# Patient Record
Sex: FEMALE | Race: WHITE | NOT HISPANIC OR LATINO | Employment: UNEMPLOYED | URBAN - METROPOLITAN AREA
[De-identification: names, ages, dates, MRNs, and addresses within clinical notes are randomized per-mention and may not be internally consistent; named-entity substitution may affect disease eponyms.]

---

## 2019-02-21 ENCOUNTER — HOSPITAL ENCOUNTER (OUTPATIENT)
Dept: CARDIOLOGY | Facility: HOSPITAL | Age: 34
Discharge: HOME | End: 2019-02-21
Attending: SURGERY

## 2019-03-24 ENCOUNTER — HOSPITAL ENCOUNTER (OUTPATIENT)
Dept: URGENT CARE | Facility: CLINIC | Age: 34
Discharge: HOME | End: 2019-03-24

## 2020-01-30 ENCOUNTER — HOSPITAL ENCOUNTER (OUTPATIENT)
Dept: OTHER | Facility: HOSPITAL | Age: 35
Discharge: HOME | End: 2020-01-30

## 2021-01-22 ENCOUNTER — HOSPITAL ENCOUNTER (OUTPATIENT)
Dept: LAB | Facility: HOSPITAL | Age: 36
Discharge: HOME | End: 2021-01-22
Attending: PHYSICIAN ASSISTANT

## 2021-01-22 ENCOUNTER — OFFICE VISIT CONVERTED (OUTPATIENT)
Dept: FAMILY MEDICINE CLINIC | Facility: CLINIC | Age: 36
End: 2021-01-22
Attending: PHYSICIAN ASSISTANT

## 2021-01-22 ENCOUNTER — HOSPITAL ENCOUNTER (OUTPATIENT)
Dept: GENERAL RADIOLOGY | Facility: HOSPITAL | Age: 36
Discharge: HOME | End: 2021-01-22
Attending: PHYSICIAN ASSISTANT

## 2021-01-22 ENCOUNTER — CONVERSION ENCOUNTER (OUTPATIENT)
Dept: FAMILY MEDICINE CLINIC | Facility: CLINIC | Age: 36
End: 2021-01-22

## 2021-01-22 LAB
ALBUMIN SERPL-MCNC: 4.2 G/DL (ref 3.5–5)
ALBUMIN/GLOB SERPL: 1.4 {RATIO} (ref 1.4–2.6)
ALP SERPL-CCNC: 91 U/L (ref 42–98)
ALT SERPL-CCNC: 14 U/L (ref 10–40)
ANION GAP SERPL CALC-SCNC: 16 MMOL/L (ref 8–19)
AST SERPL-CCNC: 18 U/L (ref 15–50)
BASOPHILS # BLD AUTO: 0.07 10*3/UL (ref 0–0.2)
BASOPHILS NFR BLD AUTO: 0.7 % (ref 0–3)
BILIRUB SERPL-MCNC: 0.26 MG/DL (ref 0.2–1.3)
BUN SERPL-MCNC: 13 MG/DL (ref 5–25)
BUN/CREAT SERPL: 18 {RATIO} (ref 6–20)
CALCIUM SERPL-MCNC: 9 MG/DL (ref 8.7–10.4)
CHLORIDE SERPL-SCNC: 105 MMOL/L (ref 99–111)
CHOLEST SERPL-MCNC: 205 MG/DL (ref 107–200)
CHOLEST/HDLC SERPL: 3.8 {RATIO} (ref 3–6)
CONV ABS IMM GRAN: 0.04 10*3/UL (ref 0–0.2)
CONV CO2: 21 MMOL/L (ref 22–32)
CONV IMMATURE GRAN: 0.4 % (ref 0–1.8)
CONV TOTAL PROTEIN: 7.1 G/DL (ref 6.3–8.2)
CREAT UR-MCNC: 0.71 MG/DL (ref 0.5–0.9)
DEPRECATED RDW RBC AUTO: 42.7 FL (ref 36.4–46.3)
EOSINOPHIL # BLD AUTO: 0.15 10*3/UL (ref 0–0.7)
EOSINOPHIL # BLD AUTO: 1.5 % (ref 0–7)
ERYTHROCYTE [DISTWIDTH] IN BLOOD BY AUTOMATED COUNT: 13.4 % (ref 11.7–14.4)
GFR SERPLBLD BASED ON 1.73 SQ M-ARVRAT: >60 ML/MIN/{1.73_M2}
GLOBULIN UR ELPH-MCNC: 2.9 G/DL (ref 2–3.5)
GLUCOSE SERPL-MCNC: 91 MG/DL (ref 65–99)
HCT VFR BLD AUTO: 39.9 % (ref 37–47)
HDLC SERPL-MCNC: 54 MG/DL (ref 40–60)
HGB BLD-MCNC: 13.1 G/DL (ref 12–16)
LDLC SERPL CALC-MCNC: 124 MG/DL (ref 70–100)
LYMPHOCYTES # BLD AUTO: 2.87 10*3/UL (ref 1–5)
LYMPHOCYTES NFR BLD AUTO: 27.8 % (ref 20–45)
MCH RBC QN AUTO: 28.5 PG (ref 27–31)
MCHC RBC AUTO-ENTMCNC: 32.8 G/DL (ref 33–37)
MCV RBC AUTO: 86.9 FL (ref 81–99)
MONOCYTES # BLD AUTO: 0.61 10*3/UL (ref 0.2–1.2)
MONOCYTES NFR BLD AUTO: 5.9 % (ref 3–10)
NEUTROPHILS # BLD AUTO: 6.57 10*3/UL (ref 2–8)
NEUTROPHILS NFR BLD AUTO: 63.7 % (ref 30–85)
NRBC CBCN: 0 % (ref 0–0.7)
OSMOLALITY SERPL CALC.SUM OF ELEC: 286 MOSM/KG (ref 273–304)
PLATELET # BLD AUTO: 268 10*3/UL (ref 130–400)
PMV BLD AUTO: 10.2 FL (ref 9.4–12.3)
POTASSIUM SERPL-SCNC: 4.3 MMOL/L (ref 3.5–5.3)
RBC # BLD AUTO: 4.59 10*6/UL (ref 4.2–5.4)
SODIUM SERPL-SCNC: 138 MMOL/L (ref 135–147)
TRIGL SERPL-MCNC: 134 MG/DL (ref 40–150)
TSH SERPL-ACNC: 3.32 M[IU]/L (ref 0.27–4.2)
VLDLC SERPL-MCNC: 27 MG/DL (ref 5–37)
WBC # BLD AUTO: 10.31 10*3/UL (ref 4.8–10.8)

## 2021-05-05 ENCOUNTER — HOSPITAL ENCOUNTER (OUTPATIENT)
Dept: OTHER | Facility: HOSPITAL | Age: 36
Discharge: HOME | End: 2021-05-05
Attending: NURSE PRACTITIONER

## 2021-05-10 NOTE — H&P
History and Physical      Patient Name: Tona Porter   Patient ID: [id number]   Sex: Female   YOB: 1985    Primary Care Provider: Brittany SEARS   Referring Provider: Brittany SEARS    Visit Date: January 22, 2021    Provider: ORION Vallejo   [place] [facility] [place] Address: [address] [place] [address], [address]  [place], [place]  [id number]   Location Phone: [phone]          Chief Complaint  · New Patient-Establish Care   · Needing referral to Pulmonology & Neurology       History Of Present Illness  Tona Proter is a 35 year old /White female who presents for evaluation and treatment of:      New Patient to establish care, previous patient at [facility] in [facility] but states they moved; last seen about 5 years ago.     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 States last CXR was 2011.    She also has RSD in her right arm and is needing referral to Neurology; previously saw neuro with [facility] unsure of name. She states she was last seen in [id number]. She states she was seeing Pain Management ([facility] but was discharged then referred to [place] but never went) but did not like seeing them.    Labs: over 5 years  Pap: overdue       Past Medical History  Disease Name Date Onset Notes   Arthritis --  --    Seasonal allergies --  --    Shoulder Impingement, left 08/04/2017 --          Past Surgical History  Procedure Name Date Notes   Tubal ligation --  --          Medication List  Name Date Started Instructions   cyclobenzaprine 10 mg oral tablet  1 tablet once a day   Seroquel 100 mg oral tablet  takt 1/2 tablet once at night         Allergy List  Allergen Name Date Reaction Notes   PENICILLINS --  --  --          Family Medical History  Disease Name Relative/Age Notes   *No Known Family History  --    Family history of certain chronic  "disabling diseases; arthritis Brother/  Father/  Mother/  Sister/   Mother; Father; Sister; Brother         Social History  Finding Status Start/Stop Quantity Notes   Alcohol Use Current some day --/-- --  rarely drinks, less than 1 drink per day, has been drinking for 6-10 years  08/04/201 occassionally   lives alone --  --/-- --  --    Recreational Drug Use Never --/-- --  no   Single. --  --/-- --  --    Tobacco Current every day --/-- 0.5 PPD current every day smoker, 0.5 pack per day, smoked 11-20 years  current every day smoker, 1 packs per day, smoked 6-10 years   Unemployed. --  --/-- --  --          Review of Systems  · Constitutional  o Denies  o : fatigue, night sweats  · Eyes  o Denies  o : double vision, blurred vision  · HENT  o Denies  o : vertigo, recent head injury  · Breasts  o Denies  o : abnormal changes in breast size, additional breast symptoms except as noted in the HPI  · Cardiovascular  o Denies  o : chest pain, irregular heart beats  · Respiratory  o Admits  o : shortness of breath, productive cough  · Gastrointestinal  o Admits  o : nausea  o Denies  o : vomiting  · Genitourinary  o Admits  o : frequency  o Denies  o : dysuria, urinary retention  · Integument  o Denies  o : hair growth change, new skin lesions  · Neurologic  o Admits  o : right arm rsd pain  o Denies  o : altered mental status, seizures  · Musculoskeletal  o Denies  o : joint swelling, limitation of motion  · Endocrine  o Denies  o : cold intolerance, heat intolerance  · Heme-Lymph  o Denies  o : petechiae, lymph node enlargement or tenderness  · Allergic-Immunologic  o Denies  o : frequent illnesses      Vitals  Date Time BP Position Site L\R Cuff Size HR RR TEMP (F) WT  HT  BMI kg/m2 BSA m2 O2 Sat FR L/min FiO2 HC       01/22/2021 09:15 /64 Sitting    76 - R  97.5 143lbs 4oz 5'  2\" 26.2 1.69 99 %  21%          Physical Examination  · Constitutional  o Appearance  o : well developed, well-nourished, no acute " distress  · Head and Face  o Head  o : normocephalic, atraumatic  · Neck  o Inspection/Palpation  o : normal appearance, no masses or tenderness, trachea midline  o Thyroid  o : gland size normal, nontender, no nodules or masses present on palpation  · Respiratory  o Respiratory Effort  o : breathing unlabored  o Inspection of Chest  o : chest rise symmetric bilaterally  o Auscultation of Lungs  o : clear to auscultation bilaterally throughout inspiration and expiration  · Cardiovascular  o Heart  o :   § Auscultation of Heart  § : regular rate and rhythm, no murmurs, gallops or rubs  o Peripheral Vascular System  o :   § Extremities  § : no edema  · Lymphatic  o Neck  o : no cervical lymphadenopathy, no supraclavicular lymphadenopathy  · Psychiatric  o Mood and Affect  o : mood normal, affect appropriate              Assessment  · Screening for depression     V79.0/Z13.89  · Screening for lipid disorders     V77.91/Z13.220  · Nicotine dependence     305.1/F17.200  · Lung mass     786.6/R91.8  · RSD (reflex sympathetic dystrophy)     337.20/G90.50  · Establishing care with new doctor, encounter for     V65.8/Z76.89       CXR. Refer to neurology and will check basic labs. Continued care will depend upon CXR results and labs.     Problems Reconciled  Plan  · Orders  o ACO-17: Screened for tobacco use AND received tobacco cessation intervention (4004F) - 305.1/F17.200 - 01/22/2021  o Physical, Primary Care Panel (CBC, CMP, Lipid, TSH) [facility] (89689, 82265, 66506, 79163) - 786.6/R91.8, 337.20/G90.50, V77.91/Z13.220 - 01/22/2021  o ACO-18: Negative screen for clinical depression using a standardized tool () - - 01/22/2021  o ACO-14: Influenza immunization was not administered for reasons documented [facility] () - - 01/22/2021  o ACO-39: Current medications updated and reviewed (, 9719F) - - 01/22/2021  o CXR PA/Lat [facility] (14106) - 786.6/R91.8 - 01/22/2021  o NEUROLOGY CONSULTATION. (NEURO) -  337.20/G90.50 - 01/22/2021  · Medications  o Medications have been Reconciled  o Transition of Care or Provider Policy  · Instructions  o Depression Screen completed and scanned into the EMR under the designated folder within the patient's documents.  o Today's PHQ-9 result is _0__  o *Form of nicotine being used: cigarettes  o Patient was strongly encouraged to discontinue use of any nicotine containing product or minimize the use of the product.  o Patient was educated/instructed on their diagnosis, treatment and medications prior to discharge from the clinic today.  o Call the office with any concerns or questions.  o Chronic conditions reviewed and taken into consideration for today's treatment plan.  o Discussed Covid-19 precautions including, but not limited to, social distancing, avoid touching your face, and hand washing.   o Electronically Identified Patient Education Materials Provided Electronically  · Disposition  o Call or Return if symptoms worsen or persist.  o Follow Up PRN.            Electronically Signed by: ORION Vallejo -Author on January 22, 2021 10:33:57 AM

## 2021-05-14 VITALS
SYSTOLIC BLOOD PRESSURE: 100 MMHG | TEMPERATURE: 97.5 F | OXYGEN SATURATION: 99 % | HEIGHT: 62 IN | HEART RATE: 76 BPM | WEIGHT: 143.25 LBS | BODY MASS INDEX: 26.36 KG/M2 | DIASTOLIC BLOOD PRESSURE: 64 MMHG

## 2021-05-21 ENCOUNTER — HOSPITAL ENCOUNTER (OUTPATIENT)
Dept: OTHER | Facility: HOSPITAL | Age: 36
Discharge: HOME | End: 2021-05-21
Attending: NURSE PRACTITIONER

## 2021-06-09 ENCOUNTER — OFFICE VISIT (OUTPATIENT)
Dept: NEUROLOGY | Facility: CLINIC | Age: 36
End: 2021-06-09

## 2021-06-09 VITALS
SYSTOLIC BLOOD PRESSURE: 128 MMHG | WEIGHT: 142 LBS | DIASTOLIC BLOOD PRESSURE: 83 MMHG | HEIGHT: 62 IN | BODY MASS INDEX: 26.13 KG/M2

## 2021-06-09 DIAGNOSIS — G90.511 COMPLEX REGIONAL PAIN SYNDROME TYPE 1 OF RIGHT UPPER EXTREMITY: Primary | ICD-10-CM

## 2021-06-09 PROCEDURE — 99215 OFFICE O/P EST HI 40 MIN: CPT | Performed by: NURSE PRACTITIONER

## 2021-06-09 RX ORDER — ESCITALOPRAM OXALATE 10 MG/1
10 TABLET ORAL DAILY
COMMUNITY
Start: 2021-04-27

## 2021-06-09 RX ORDER — ERGOCALCIFEROL 1.25 MG/1
CAPSULE ORAL
COMMUNITY
Start: 2021-06-01

## 2021-06-09 NOTE — ASSESSMENT & PLAN NOTE
PCP could consider switching lexapro to cymbalta or amitripyline for anti-neuritic properties.  Will refer to pain management for further pain control.
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
## 2021-07-10 ENCOUNTER — APPOINTMENT (OUTPATIENT)
Dept: GENERAL RADIOLOGY | Facility: HOSPITAL | Age: 36
End: 2021-07-10

## 2021-07-10 ENCOUNTER — APPOINTMENT (OUTPATIENT)
Dept: CT IMAGING | Facility: HOSPITAL | Age: 36
End: 2021-07-10

## 2021-07-10 ENCOUNTER — HOSPITAL ENCOUNTER (EMERGENCY)
Facility: HOSPITAL | Age: 36
Discharge: HOME | End: 2021-07-10
Attending: EMERGENCY MEDICINE | Admitting: EMERGENCY MEDICINE

## 2021-07-10 VITALS
OXYGEN SATURATION: 97 % | HEIGHT: 62 IN | HEART RATE: 89 BPM | RESPIRATION RATE: 16 BRPM | BODY MASS INDEX: 27.6 KG/M2 | WEIGHT: 150 LBS | TEMPERATURE: 98.1 F | SYSTOLIC BLOOD PRESSURE: 148 MMHG | DIASTOLIC BLOOD PRESSURE: 89 MMHG

## 2021-07-10 DIAGNOSIS — S52.022A FRACTURE, OLECRANON, LEFT, CLOSED, INITIAL ENCOUNTER: Primary | ICD-10-CM

## 2021-07-10 PROCEDURE — 96375 TX/PRO/DX INJ NEW DRUG ADDON: CPT

## 2021-07-10 PROCEDURE — 73200 CT UPPER EXTREMITY W/O DYE: CPT

## 2021-07-10 PROCEDURE — 73110 X-RAY EXAM OF WRIST: CPT | Performed by: RADIOLOGY

## 2021-07-10 PROCEDURE — 73110 X-RAY EXAM OF WRIST: CPT

## 2021-07-10 PROCEDURE — 73120 X-RAY EXAM OF HAND: CPT | Performed by: RADIOLOGY

## 2021-07-10 PROCEDURE — 25010000002 ONDANSETRON PER 1 MG: Performed by: EMERGENCY MEDICINE

## 2021-07-10 PROCEDURE — 99284 EMERGENCY DEPT VISIT MOD MDM: CPT

## 2021-07-10 PROCEDURE — 25010000002 TDAP 5-2.5-18.5 LF-MCG/0.5 SUSPENSION: Performed by: EMERGENCY MEDICINE

## 2021-07-10 PROCEDURE — 73080 X-RAY EXAM OF ELBOW: CPT

## 2021-07-10 PROCEDURE — 90471 IMMUNIZATION ADMIN: CPT | Performed by: EMERGENCY MEDICINE

## 2021-07-10 PROCEDURE — 90715 TDAP VACCINE 7 YRS/> IM: CPT | Performed by: EMERGENCY MEDICINE

## 2021-07-10 PROCEDURE — 25010000002 HYDROMORPHONE 1 MG/ML SOLUTION: Performed by: EMERGENCY MEDICINE

## 2021-07-10 PROCEDURE — 73080 X-RAY EXAM OF ELBOW: CPT | Performed by: RADIOLOGY

## 2021-07-10 PROCEDURE — 96374 THER/PROPH/DIAG INJ IV PUSH: CPT

## 2021-07-10 PROCEDURE — 73120 X-RAY EXAM OF HAND: CPT

## 2021-07-10 PROCEDURE — 96372 THER/PROPH/DIAG INJ SC/IM: CPT

## 2021-07-10 PROCEDURE — 96376 TX/PRO/DX INJ SAME DRUG ADON: CPT

## 2021-07-10 RX ORDER — OXYCODONE HYDROCHLORIDE AND ACETAMINOPHEN 5; 325 MG/1; MG/1
1 TABLET ORAL EVERY 6 HOURS PRN
Qty: 15 TABLET | Refills: 0 | Status: SHIPPED | OUTPATIENT
Start: 2021-07-10 | End: 2021-07-15 | Stop reason: HOSPADM

## 2021-07-10 RX ORDER — OXYCODONE HYDROCHLORIDE AND ACETAMINOPHEN 5; 325 MG/1; MG/1
1 TABLET ORAL ONCE
Status: COMPLETED | OUTPATIENT
Start: 2021-07-10 | End: 2021-07-10

## 2021-07-10 RX ORDER — ONDANSETRON 2 MG/ML
4 INJECTION INTRAMUSCULAR; INTRAVENOUS ONCE
Status: COMPLETED | OUTPATIENT
Start: 2021-07-10 | End: 2021-07-10

## 2021-07-10 RX ORDER — OXYCODONE HYDROCHLORIDE AND ACETAMINOPHEN 5; 325 MG/1; MG/1
1 TABLET ORAL EVERY 6 HOURS PRN
Qty: 12 TABLET | Refills: 0 | Status: SHIPPED | OUTPATIENT
Start: 2021-07-10 | End: 2021-07-10 | Stop reason: SDUPTHER

## 2021-07-10 RX ORDER — ONDANSETRON 4 MG/1
4 TABLET, ORALLY DISINTEGRATING ORAL EVERY 6 HOURS PRN
Qty: 10 TABLET | Refills: 0 | Status: SHIPPED | OUTPATIENT
Start: 2021-07-10 | End: 2023-03-22

## 2021-07-10 RX ADMIN — ONDANSETRON 4 MG: 2 INJECTION INTRAMUSCULAR; INTRAVENOUS at 18:07

## 2021-07-10 RX ADMIN — OXYCODONE HYDROCHLORIDE AND ACETAMINOPHEN 1 TABLET: 5; 325 TABLET ORAL at 22:24

## 2021-07-10 RX ADMIN — HYDROMORPHONE HYDROCHLORIDE 1 MG: 1 INJECTION, SOLUTION INTRAMUSCULAR; INTRAVENOUS; SUBCUTANEOUS at 18:07

## 2021-07-10 RX ADMIN — TETANUS TOXOID, REDUCED DIPHTHERIA TOXOID AND ACELLULAR PERTUSSIS VACCINE, ADSORBED 0.5 ML: 5; 2.5; 8; 8; 2.5 SUSPENSION INTRAMUSCULAR at 18:08

## 2021-07-10 RX ADMIN — HYDROMORPHONE HYDROCHLORIDE 1 MG: 1 INJECTION, SOLUTION INTRAMUSCULAR; INTRAVENOUS; SUBCUTANEOUS at 20:00
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
## 2021-07-12 ENCOUNTER — TELEPHONE (OUTPATIENT)
Dept: ORTHOPEDIC SURGERY | Facility: CLINIC | Age: 36
End: 2021-07-12
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
## 2021-07-14 ENCOUNTER — OFFICE VISIT (OUTPATIENT)
Dept: ORTHOPEDIC SURGERY | Facility: CLINIC | Age: 36
End: 2021-07-14

## 2021-07-14 ENCOUNTER — PREP FOR SURGERY (OUTPATIENT)
Dept: OTHER | Facility: HOSPITAL | Age: 36
End: 2021-07-14

## 2021-07-14 VITALS — HEIGHT: 62 IN | WEIGHT: 115 LBS | OXYGEN SATURATION: 98 % | BODY MASS INDEX: 21.16 KG/M2 | HEART RATE: 89 BPM

## 2021-07-14 DIAGNOSIS — S42.402A CLOSED FRACTURE OF LEFT ELBOW, INITIAL ENCOUNTER: Primary | ICD-10-CM

## 2021-07-14 DIAGNOSIS — S63.501A SPRAIN OF RIGHT WRIST, INITIAL ENCOUNTER: ICD-10-CM

## 2021-07-14 PROBLEM — S52.022A OLECRANON FRACTURE, LEFT, CLOSED, INITIAL ENCOUNTER: Status: ACTIVE | Noted: 2021-07-14

## 2021-07-14 PROCEDURE — 99203 OFFICE O/P NEW LOW 30 MIN: CPT | Performed by: ORTHOPAEDIC SURGERY

## 2021-07-14 RX ORDER — CEFAZOLIN SODIUM 2 G/100ML
2 INJECTION, SOLUTION INTRAVENOUS ONCE
Status: CANCELLED | OUTPATIENT
Start: 2021-07-14 | End: 2021-07-14

## 2021-07-14 RX ORDER — CEFAZOLIN SODIUM IN 0.9 % NACL 3 G/100 ML
3 INTRAVENOUS SOLUTION, PIGGYBACK (ML) INTRAVENOUS ONCE
Status: CANCELLED | OUTPATIENT
Start: 2021-07-14 | End: 2021-07-14
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
## 2021-07-15 ENCOUNTER — APPOINTMENT (OUTPATIENT)
Dept: GENERAL RADIOLOGY | Facility: HOSPITAL | Age: 36
End: 2021-07-15

## 2021-07-15 ENCOUNTER — HOSPITAL ENCOUNTER (OUTPATIENT)
Facility: HOSPITAL | Age: 36
Discharge: HOME | End: 2021-07-15
Attending: ORTHOPAEDIC SURGERY | Admitting: ORTHOPAEDIC SURGERY

## 2021-07-15 ENCOUNTER — ANESTHESIA (OUTPATIENT)
Dept: PERIOP | Facility: HOSPITAL | Age: 36
End: 2021-07-15

## 2021-07-15 ENCOUNTER — ANESTHESIA EVENT (OUTPATIENT)
Dept: PERIOP | Facility: HOSPITAL | Age: 36
End: 2021-07-15

## 2021-07-15 VITALS
HEIGHT: 62 IN | RESPIRATION RATE: 16 BRPM | BODY MASS INDEX: 28.16 KG/M2 | OXYGEN SATURATION: 96 % | TEMPERATURE: 97.4 F | SYSTOLIC BLOOD PRESSURE: 132 MMHG | WEIGHT: 153 LBS | DIASTOLIC BLOOD PRESSURE: 75 MMHG | HEART RATE: 78 BPM

## 2021-07-15 DIAGNOSIS — S52.022A OLECRANON FRACTURE, LEFT, CLOSED, INITIAL ENCOUNTER: Primary | ICD-10-CM

## 2021-07-15 DIAGNOSIS — S42.402A CLOSED FRACTURE OF LEFT ELBOW, INITIAL ENCOUNTER: ICD-10-CM

## 2021-07-15 PROCEDURE — 76000 FLUOROSCOPY <1 HR PHYS/QHP: CPT

## 2021-07-15 PROCEDURE — 25010000002 ROPIVACAINE PER 1 MG: Performed by: ANESTHESIOLOGY

## 2021-07-15 PROCEDURE — 25010000002 MIDAZOLAM PER 1 MG: Performed by: ANESTHESIOLOGY

## 2021-07-15 PROCEDURE — 25010000002 ONDANSETRON PER 1 MG: Performed by: NURSE ANESTHETIST, CERTIFIED REGISTERED

## 2021-07-15 PROCEDURE — 24685 OPTX ULNAR FX PROX END W/FIX: CPT | Performed by: ORTHOPAEDIC SURGERY

## 2021-07-15 PROCEDURE — 25010000002 ONDANSETRON PER 1 MG: Performed by: ORTHOPAEDIC SURGERY

## 2021-07-15 PROCEDURE — C1713 ANCHOR/SCREW BN/BN,TIS/BN: HCPCS | Performed by: ORTHOPAEDIC SURGERY

## 2021-07-15 PROCEDURE — 73070 X-RAY EXAM OF ELBOW: CPT

## 2021-07-15 PROCEDURE — 25010000002 DEXAMETHASONE PER 1 MG: Performed by: NURSE ANESTHETIST, CERTIFIED REGISTERED

## 2021-07-15 PROCEDURE — 25010000002 FENTANYL CITRATE (PF) 50 MCG/ML SOLUTION: Performed by: NURSE ANESTHETIST, CERTIFIED REGISTERED

## 2021-07-15 PROCEDURE — 73070 X-RAY EXAM OF ELBOW: CPT | Performed by: RADIOLOGY

## 2021-07-15 PROCEDURE — C1889 IMPLANT/INSERT DEVICE, NOC: HCPCS | Performed by: ORTHOPAEDIC SURGERY

## 2021-07-15 PROCEDURE — 25010000002 PROPOFOL 10 MG/ML EMULSION: Performed by: NURSE ANESTHETIST, CERTIFIED REGISTERED

## 2021-07-15 PROCEDURE — 25010000003 CEFAZOLIN IN DEXTROSE 2-4 GM/100ML-% SOLUTION: Performed by: ORTHOPAEDIC SURGERY

## 2021-07-15 PROCEDURE — C1769 GUIDE WIRE: HCPCS | Performed by: ORTHOPAEDIC SURGERY

## 2021-07-15 PROCEDURE — 76942 ECHO GUIDE FOR BIOPSY: CPT | Performed by: ORTHOPAEDIC SURGERY

## 2021-07-15 DEVICE — SCRW LK S/TAP T8STRDRV 2.7X16MM: Type: IMPLANTABLE DEVICE | Site: ELBOW | Status: FUNCTIONAL

## 2021-07-15 DEVICE — SCRW LK S/TAP T8STRDRV 2.7X14MM: Type: IMPLANTABLE DEVICE | Site: ELBOW | Status: FUNCTIONAL

## 2021-07-15 DEVICE — SCRW LK S/TAP T8STRDRV 2.7X20MM: Type: IMPLANTABLE DEVICE | Site: ELBOW | Status: FUNCTIONAL

## 2021-07-15 DEVICE — SUT FW #2 W/TPR NDL 1/2 CIR 38IN 97CM 26.5MM BLU: Type: IMPLANTABLE DEVICE | Site: ELBOW | Status: FUNCTIONAL

## 2021-07-15 DEVICE — PLT OLEC VA/LCP 2H SS 2.7/3.5X90MM LT: Type: IMPLANTABLE DEVICE | Site: ELBOW | Status: FUNCTIONAL

## 2021-07-15 DEVICE — SCRW LK S/TAP T8STRDRV 2.7X22MM: Type: IMPLANTABLE DEVICE | Site: ELBOW | Status: FUNCTIONAL

## 2021-07-15 DEVICE — SCRW LK S/TAP T8STRDRV 2.7X18MM: Type: IMPLANTABLE DEVICE | Site: ELBOW | Status: FUNCTIONAL

## 2021-07-15 DEVICE — SCRW CORT S/TAP 3.5X20MM: Type: IMPLANTABLE DEVICE | Site: ELBOW | Status: FUNCTIONAL

## 2021-07-15 DEVICE — SCRW LK S/TAP STRDRV 3.5X14MM: Type: IMPLANTABLE DEVICE | Site: ELBOW | Status: FUNCTIONAL

## 2021-07-15 RX ORDER — PROPOFOL 10 MG/ML
VIAL (ML) INTRAVENOUS AS NEEDED
Status: DISCONTINUED | OUTPATIENT
Start: 2021-07-15 | End: 2021-07-15 | Stop reason: SURG

## 2021-07-15 RX ORDER — OXYCODONE HYDROCHLORIDE AND ACETAMINOPHEN 5; 325 MG/1; MG/1
1 TABLET ORAL ONCE AS NEEDED
Status: DISCONTINUED | OUTPATIENT
Start: 2021-07-15 | End: 2021-07-15 | Stop reason: HOSPADM

## 2021-07-15 RX ORDER — DEXAMETHASONE SODIUM PHOSPHATE 4 MG/ML
INJECTION, SOLUTION INTRA-ARTICULAR; INTRALESIONAL; INTRAMUSCULAR; INTRAVENOUS; SOFT TISSUE AS NEEDED
Status: DISCONTINUED | OUTPATIENT
Start: 2021-07-15 | End: 2021-07-15 | Stop reason: SURG

## 2021-07-15 RX ORDER — MIDAZOLAM HYDROCHLORIDE 1 MG/ML
3 INJECTION INTRAMUSCULAR; INTRAVENOUS ONCE
Status: COMPLETED | OUTPATIENT
Start: 2021-07-15 | End: 2021-07-15

## 2021-07-15 RX ORDER — ACETAMINOPHEN 500 MG
1000 TABLET ORAL ONCE
Status: COMPLETED | OUTPATIENT
Start: 2021-07-15 | End: 2021-07-15

## 2021-07-15 RX ORDER — CEFAZOLIN SODIUM IN 0.9 % NACL 3 G/100 ML
3 INTRAVENOUS SOLUTION, PIGGYBACK (ML) INTRAVENOUS ONCE
Status: DISCONTINUED | OUTPATIENT
Start: 2021-07-15 | End: 2021-07-15 | Stop reason: DRUGHIGH

## 2021-07-15 RX ORDER — MAGNESIUM HYDROXIDE 1200 MG/15ML
LIQUID ORAL AS NEEDED
Status: DISCONTINUED | OUTPATIENT
Start: 2021-07-15 | End: 2021-07-15 | Stop reason: HOSPADM

## 2021-07-15 RX ORDER — LIDOCAINE HYDROCHLORIDE 20 MG/ML
INJECTION, SOLUTION INFILTRATION; PERINEURAL AS NEEDED
Status: DISCONTINUED | OUTPATIENT
Start: 2021-07-15 | End: 2021-07-15 | Stop reason: SURG

## 2021-07-15 RX ORDER — MEPERIDINE HYDROCHLORIDE 25 MG/ML
12.5 INJECTION INTRAMUSCULAR; INTRAVENOUS; SUBCUTANEOUS
Status: CANCELLED | OUTPATIENT
Start: 2021-07-15 | End: 2021-07-16

## 2021-07-15 RX ORDER — CELECOXIB 100 MG/1
200 CAPSULE ORAL ONCE
Status: COMPLETED | OUTPATIENT
Start: 2021-07-15 | End: 2021-07-15

## 2021-07-15 RX ORDER — ONDANSETRON 2 MG/ML
4 INJECTION INTRAMUSCULAR; INTRAVENOUS ONCE AS NEEDED
Status: CANCELLED | OUTPATIENT
Start: 2021-07-15

## 2021-07-15 RX ORDER — GLYCOPYRROLATE 0.2 MG/ML
0.2 INJECTION INTRAMUSCULAR; INTRAVENOUS
Status: COMPLETED | OUTPATIENT
Start: 2021-07-15 | End: 2021-07-15

## 2021-07-15 RX ORDER — ROPIVACAINE HYDROCHLORIDE 5 MG/ML
INJECTION, SOLUTION EPIDURAL; INFILTRATION; PERINEURAL
Status: COMPLETED | OUTPATIENT
Start: 2021-07-15 | End: 2021-07-15

## 2021-07-15 RX ORDER — FENTANYL CITRATE 50 UG/ML
INJECTION, SOLUTION INTRAMUSCULAR; INTRAVENOUS AS NEEDED
Status: DISCONTINUED | OUTPATIENT
Start: 2021-07-15 | End: 2021-07-15 | Stop reason: SURG

## 2021-07-15 RX ORDER — OXYCODONE HYDROCHLORIDE 5 MG/1
5 TABLET ORAL
Status: CANCELLED | OUTPATIENT
Start: 2021-07-15

## 2021-07-15 RX ORDER — CEFAZOLIN SODIUM 2 G/100ML
2 INJECTION, SOLUTION INTRAVENOUS ONCE
Status: COMPLETED | OUTPATIENT
Start: 2021-07-15 | End: 2021-07-15

## 2021-07-15 RX ORDER — OXYCODONE AND ACETAMINOPHEN 7.5; 325 MG/1; MG/1
1 TABLET ORAL EVERY 4 HOURS PRN
Qty: 30 TABLET | Refills: 0 | Status: SHIPPED | OUTPATIENT
Start: 2021-07-15 | End: 2021-07-28 | Stop reason: DRUGHIGH

## 2021-07-15 RX ORDER — PROMETHAZINE HYDROCHLORIDE 25 MG/1
25 SUPPOSITORY RECTAL ONCE AS NEEDED
Status: CANCELLED | OUTPATIENT
Start: 2021-07-15

## 2021-07-15 RX ORDER — PROMETHAZINE HYDROCHLORIDE 12.5 MG/1
25 TABLET ORAL ONCE AS NEEDED
Status: CANCELLED | OUTPATIENT
Start: 2021-07-15

## 2021-07-15 RX ORDER — SODIUM CHLORIDE, SODIUM LACTATE, POTASSIUM CHLORIDE, CALCIUM CHLORIDE 600; 310; 30; 20 MG/100ML; MG/100ML; MG/100ML; MG/100ML
9 INJECTION, SOLUTION INTRAVENOUS CONTINUOUS PRN
Status: DISCONTINUED | OUTPATIENT
Start: 2021-07-15 | End: 2021-07-15 | Stop reason: HOSPADM

## 2021-07-15 RX ORDER — EPHEDRINE SULFATE 50 MG/ML
INJECTION, SOLUTION INTRAVENOUS AS NEEDED
Status: DISCONTINUED | OUTPATIENT
Start: 2021-07-15 | End: 2021-07-15 | Stop reason: SURG

## 2021-07-15 RX ORDER — ONDANSETRON 2 MG/ML
INJECTION INTRAMUSCULAR; INTRAVENOUS AS NEEDED
Status: DISCONTINUED | OUTPATIENT
Start: 2021-07-15 | End: 2021-07-15 | Stop reason: SURG

## 2021-07-15 RX ORDER — ONDANSETRON 2 MG/ML
4 INJECTION INTRAMUSCULAR; INTRAVENOUS ONCE AS NEEDED
Status: COMPLETED | OUTPATIENT
Start: 2021-07-15 | End: 2021-07-15

## 2021-07-15 RX ADMIN — ACETAMINOPHEN 500 MG: 500 TABLET ORAL at 10:28

## 2021-07-15 RX ADMIN — ONDANSETRON 4 MG: 2 INJECTION INTRAMUSCULAR; INTRAVENOUS at 13:23

## 2021-07-15 RX ADMIN — CELECOXIB 200 MG: 100 CAPSULE ORAL at 10:28

## 2021-07-15 RX ADMIN — GLYCOPYRROLATE 0.2 MG: 0.2 INJECTION INTRAMUSCULAR; INTRAVENOUS at 11:28

## 2021-07-15 RX ADMIN — ONDANSETRON 4 MG: 2 INJECTION INTRAMUSCULAR; INTRAVENOUS at 13:21

## 2021-07-15 RX ADMIN — SODIUM CHLORIDE, POTASSIUM CHLORIDE, SODIUM LACTATE AND CALCIUM CHLORIDE 9 ML/HR: 600; 310; 30; 20 INJECTION, SOLUTION INTRAVENOUS at 10:57

## 2021-07-15 RX ADMIN — CEFAZOLIN SODIUM 2 G: 2 INJECTION, SOLUTION INTRAVENOUS at 11:36

## 2021-07-15 RX ADMIN — DEXAMETHASONE SODIUM PHOSPHATE 4 MG: 4 INJECTION INTRA-ARTICULAR; INTRALESIONAL; INTRAMUSCULAR; INTRAVENOUS; SOFT TISSUE at 11:47

## 2021-07-15 RX ADMIN — MIDAZOLAM HYDROCHLORIDE 3 MG: 1 INJECTION, SOLUTION INTRAMUSCULAR; INTRAVENOUS at 10:46

## 2021-07-15 RX ADMIN — FENTANYL CITRATE 100 MCG: 50 INJECTION INTRAMUSCULAR; INTRAVENOUS at 11:38

## 2021-07-15 RX ADMIN — EPHEDRINE SULFATE 15 MG: 50 INJECTION INTRAVENOUS at 11:45

## 2021-07-15 RX ADMIN — ONDANSETRON 4 MG: 2 INJECTION INTRAMUSCULAR; INTRAVENOUS at 12:42

## 2021-07-15 RX ADMIN — PROPOFOL 200 MG: 10 INJECTION, EMULSION INTRAVENOUS at 11:38

## 2021-07-15 RX ADMIN — ROPIVACAINE HYDROCHLORIDE 25 ML: 5 INJECTION, SOLUTION EPIDURAL; INFILTRATION; PERINEURAL at 10:52

## 2021-07-15 RX ADMIN — LIDOCAINE HYDROCHLORIDE 50 MG: 20 INJECTION, SOLUTION INFILTRATION; PERINEURAL at 11:38

## 2021-07-15 NOTE — ANESTHESIA PREPROCEDURE EVALUATION
Anesthesia Evaluation     NPO Solid Status: > 8 hours  NPO Liquid Status: > 8 hours           Airway   Mallampati: II  TM distance: >3 FB  Neck ROM: full  No difficulty expected  Dental      Pulmonary - normal exam    breath sounds clear to auscultation  (+) a smoker Current,   Cardiovascular - negative cardio ROS and normal exam    Rhythm: regular        Neuro/Psych  GI/Hepatic/Renal/Endo      Musculoskeletal     Abdominal    Substance History      OB/GYN          Other                        Anesthesia Plan    ASA 2     general with block     intravenous induction     Anesthetic plan, all risks, benefits, and alternatives have been provided, discussed and informed consent has been obtained with: patient.    Plan discussed with CRNA.

## 2021-07-15 NOTE — ANESTHESIA PROCEDURE NOTES
Supraclavicular Nerve Block    Pre-sedation assessment completed: 7/15/2021 10:47 AM    Patient reassessed immediately prior to procedure    Patient location during procedure: pre-op  Start time: 7/15/2021 10:48 AM  Stop time: 7/15/2021 10:52 AM  Reason for block: at surgeon's request and post-op pain management  Performed by  Anesthesiologist: Nat Guevara MD  Preanesthetic Checklist  Completed: patient identified, IV checked, site marked, risks and benefits discussed, surgical consent, monitors and equipment checked, pre-op evaluation and timeout performed  Prep:  Pt Position: sitting (HOB elevated)  Sterile barriers:cap, washed/disinfected hands, sterile barriers, gloves, mask, partial drape and alcohol skin prep  Prep: ChloraPrep  Patient monitoring: blood pressure monitoring, continuous pulse oximetry and EKG  Procedure  Sedation:yes  Performed under: local infiltration  Guidance:ultrasound guided and nerve stimulator  ULTRASOUND INTERPRETATION.  Using ultrasound guidance a 22 G gauge needle was placed in close proximity to the brachial plexus nerve, at which point, under ultrasound guidance anesthetic was injected in the area of the nerve and spread of the anesthesia was seen on ultrasound in close proximity thereto.  There were no abnormalities seen on ultrasound; a digital image was taken; and the patient tolerated the procedure with no complications. Images:still images obtained, printed/placed on chart    Laterality:left  Block Type:supraclavicular  Injection Technique:single-shot  Needle Type:echogenic  Needle Gauge:22 G (2in)  Resistance on Injection: none    Medications Used: ropivacaine (NAROPIN) 0.5 % injection, 25 mL  Med admintered at 7/15/2021 10:52 AM      Post Assessment  Injection Assessment: negative aspiration for heme, no paresthesia on injection and incremental injection  Patient Tolerance:comfortable throughout block  Complications:no  Additional Notes  The block or continuous  infusion is requested by the referring physician for management of postoperative pain, or pain related to a procedure. Ultrasound guidance (deemed medically necessary). Painless injection, pt was awake and conversant during the procedure without complications. Needle and surrounding structures visualized throughout procedure. No adverse reactions or complications seen during this period. Post-procedure image showed no signs of complication, and anatomy was consistent with an uncomplicated nerve blockade.

## 2021-07-15 NOTE — ANESTHESIA POSTPROCEDURE EVALUATION
Patient: Tona Porter    Procedure Summary     Date: 07/15/21 Room / Location: [facility] OR  / [facility]    Anesthesia Start: 1134 Anesthesia Stop: 1259    Procedure: ELBOW OPEN REDUCTION INTERNAL FIXATION (Left Elbow) Diagnosis:       Closed fracture of left elbow, initial encounter      (Closed fracture of left elbow, initial encounter [S42.402A])    Surgeons: Arnie Acuna MD Provider: Nat Guevara MD    Anesthesia Type: general with block ASA Status: 2          Anesthesia Type: general with block    Vitals  Vitals Value Taken Time   /69 07/15/21 1341   Temp     Pulse 77 07/15/21 1343   Resp     SpO2 98 % 07/15/21 1343   Vitals shown include unvalidated device data.        Post Anesthesia Care and Evaluation    Patient location during evaluation: bedside  Patient participation: complete - patient participated  Level of consciousness: awake  Pain score: 3  Pain management: adequate  Airway patency: patent  Anesthetic complications: No anesthetic complications  PONV Status: none  Cardiovascular status: acceptable and stable  Respiratory status: acceptable and room air  Hydration status: acceptable    Comments: An Anesthesiologist personally participated in the most demanding procedures (including induction and emergence if applicable) in the anesthesia plan, monitored the course of anesthesia administration at frequent intervals and remained physically present and available for immediate diagnosis and treatment of emergencies.

## 2021-07-16 NOTE — OP NOTE
ELBOW OPEN REDUCTION INTERNAL FIXATION  Procedure Report    Patient Name:  Tona Porter  YOB: 1985    Date of Surgery:  7/15/2021     Indications: See H&P    Pre-op Diagnosis:   Closed fracture of left elbow, initial encounter [S42.402A]   Intra-articular olecranon fracture left       Post-Op Diagnosis Codes:     * Closed fracture of left elbow, initial encounter [S42.402A]  Same  Procedure/CPT® Codes:      Procedure(s):  Open reduction and internal fixation of a left olecranon fracture    Staff:  Surgeon(s):  Arnie Acuna MD    Assistant: Maryanne Mejia    Anesthesia: Choice    Estimated Blood Loss: minimal    Implants:    Implant Name Type Inv. Item Serial No.  Lot No. LRB No. Used Action   SUT FW #2 W/TPR NDL 1/2 CIR 38IN 97CM 26.5MM LOLIS - [id number] Implant SUT FW #2 W/TPR NDL 1/2 CIR 38IN 97CM 26.5MM LOLIS  [id number] Left 1 Implanted   SCRW STEFANY S/TAP 3.5X20MM - [id number] Implant SCRW STEFANY S/TAP 3.5X20MM  DEPUY SYNTHES  Left 1 Implanted   SCRW LK S/TAP STRDRV 3.5X14MM - [id number] Implant SCRW LK S/TAP STRDRV 3.5X14MM  DEPUY SYNTHES  Left 1 Implanted   PLT OLEC VA/LCP 2H SS 2.7/3.5X90MM LT - [id number] Implant PLT OLEC VA/LCP 2H SS 2.7/3.5X90MM LT  DEPUY SYNTHES  Left 1 Implanted   SCRW LK S/TAP [id number] 2.7X18MM - [id number] Implant SCRW LK S/TAP [id number] 2.7X18MM  DEPUY SYNTHES  Left 1 Implanted   SCRW LK S/TAP [id number] 2.7X20MM - [id number] Implant SCRW LK S/TAP [id number] 2.7X20MM  DEPUY SYNTHES  Left 2 Implanted   SCRW LK S/TAP [id number] 2.7X14MM - [id number] Implant SCRW LK S/TAP [id number] 2.7X14MM  DEPUY SYNTHES  Left 1 Implanted   SCRW LK S/TAP [id number] 2.7X16MM - [id number] Implant SCRW LK S/TAP [id number] 2.7X16MM  DEPUY SYNTHES  Left 1 Implanted   SCRW LK S/TAP [id number] 2.7X22MM - [id number] Implant SCRW LK S/TAP [id number] 2.7X22MM  DEPUY SYNTHES  Left 1 Implanted       Specimen:          None        Findings: See below    Complications: None    Description of  Procedure: Patient was taken to the operating room placed lateral in the operating room table in the beanbag after general endotracheal anesthesia was established the left arm and upper extremity were prepped and draped in standard surgical fashion using alcohol ChloraPrep a curvilinear incision was made posteriorly over the left olecranon process dissection was carried down the fracture site the fracture site was repaired using a knife curette and irrigation.  Temporary reduction was held manually Y [employer]ynthes olecranon locking plate was fashioned on the proximal ulna verified good position on C arm the patient was noted to have significant comminution of air intra-articularly component of the olecranon fracture there is no loose bodies in the joint locking screws were used proximally and then a cortical screw distal to the fracture site to compress the plate to the bone with adult locking screws were used as needed C-arm shots revealed satisfactory reduction and fixation of the fracture the wound was copiously irrigated the deep tissues 0 Vicryl subcu 2-0 Vicryl skin was closed with staples incision was washed and dried and sterile dressings were applied.  Patient tolerated seizure well second recovery room      Arnie Acuna MD     Date: 7/16/2021  Time: 07:03 EDT

## 2021-07-28 ENCOUNTER — OFFICE VISIT (OUTPATIENT)
Dept: ORTHOPEDIC SURGERY | Facility: CLINIC | Age: 36
End: 2021-07-28

## 2021-07-28 VITALS — HEART RATE: 84 BPM | HEIGHT: 62 IN | BODY MASS INDEX: 27.6 KG/M2 | OXYGEN SATURATION: 98 % | WEIGHT: 150 LBS

## 2021-07-28 DIAGNOSIS — S42.402D CLOSED FRACTURE OF LEFT ELBOW WITH ROUTINE HEALING, SUBSEQUENT ENCOUNTER: ICD-10-CM

## 2021-07-28 DIAGNOSIS — S42.402A CLOSED FRACTURE OF LEFT ELBOW, INITIAL ENCOUNTER: Primary | ICD-10-CM

## 2021-07-28 DIAGNOSIS — M25.522 LEFT ELBOW PAIN: Primary | ICD-10-CM

## 2021-07-28 PROCEDURE — 99024 POSTOP FOLLOW-UP VISIT: CPT | Performed by: PHYSICIAN ASSISTANT

## 2021-07-28 RX ORDER — HYDROCODONE BITARTRATE AND ACETAMINOPHEN 5; 325 MG/1; MG/1
1 TABLET ORAL EVERY 6 HOURS PRN
Qty: 28 TABLET | Refills: 0 | Status: SHIPPED | OUTPATIENT
Start: 2021-07-28

## 2021-07-28 NOTE — PATIENT INSTRUCTIONS
Recommend patient wear hinged brace locked at 30 degrees. Instructed patient to take the brace off 3-5 times per day to work on gentle passive range of motion, exercises provided today and demonstrated in the clinic.  Recommend resting, icing and elevating the extremity.  Tylenol or ibuprofen for pain relief.  Recommend no heavy lifting pushing or pulling with the left upper extremity.  Plan to follow-up with patient in 3 weeks with x-ray at that time.  Plan to start physical therapy after next visit.
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
## 2021-08-13 ENCOUNTER — TRANSCRIBE ORDERS (OUTPATIENT)
Dept: ADMINISTRATIVE | Facility: HOSPITAL | Age: 36
End: 2021-08-13

## 2021-08-13 ENCOUNTER — HOSPITAL ENCOUNTER (OUTPATIENT)
Dept: GENERAL RADIOLOGY | Facility: HOSPITAL | Age: 36
Discharge: HOME | End: 2021-08-13

## 2021-08-13 DIAGNOSIS — M54.2 CERVICALGIA: Primary | ICD-10-CM

## 2021-08-13 DIAGNOSIS — M25.521 PAIN IN RIGHT ELBOW: ICD-10-CM

## 2021-08-13 DIAGNOSIS — M54.2 CERVICALGIA: ICD-10-CM

## 2021-08-13 PROCEDURE — 73070 X-RAY EXAM OF ELBOW: CPT

## 2021-08-13 PROCEDURE — 72050 X-RAY EXAM NECK SPINE 4/5VWS: CPT

## 2021-08-18 ENCOUNTER — OFFICE VISIT (OUTPATIENT)
Dept: ORTHOPEDIC SURGERY | Facility: CLINIC | Age: 36
End: 2021-08-18

## 2021-08-18 VITALS — WEIGHT: 151 LBS | HEART RATE: 83 BPM | BODY MASS INDEX: 27.79 KG/M2 | HEIGHT: 62 IN | OXYGEN SATURATION: 98 %

## 2021-08-18 DIAGNOSIS — Z47.89 AFTERCARE FOLLOWING SURGERY OF THE MUSCULOSKELETAL SYSTEM: ICD-10-CM

## 2021-08-18 DIAGNOSIS — M25.522 LEFT ELBOW PAIN: Primary | ICD-10-CM

## 2021-08-18 DIAGNOSIS — S42.402A CLOSED FRACTURE OF LEFT ELBOW, INITIAL ENCOUNTER: ICD-10-CM

## 2021-08-18 PROCEDURE — 99024 POSTOP FOLLOW-UP VISIT: CPT | Performed by: PHYSICIAN ASSISTANT

## 2021-08-18 RX ORDER — IBUPROFEN 600 MG/1
600 TABLET ORAL EVERY 8 HOURS PRN
Qty: 30 TABLET | Refills: 0 | Status: SHIPPED | OUTPATIENT
Start: 2021-08-18

## 2021-08-18 NOTE — PROGRESS NOTES
"Chief Complaint  Pain of the Left Elbow    Subjective          Tona Porter presents to [facility] for follow-up on left elbow status post ORIF left comminuted olecranon fracture by Dr. Acuna 7/15/2021.  States she is doing well.  She presents today not wearing her brace.  She states she has been wearing it sometimes, and taking it off at other times.  She states that she has been doing things around the house like sleeping.  She states she has still has a lot of stiffness in the elbow.    Objective   Vital Signs:   Pulse 83   Ht 157.5 cm (62\")   Wt 68.5 kg (151 lb)   SpO2 98%   BMI 27.62 kg/m²       Physical Exam  Constitutional:       Appearance: Normal appearance. He is well-developed and normal weight.   HENT:      Head: Normocephalic.      Right Ear: Hearing and external ear normal.      Left Ear: Hearing and external ear normal.      Nose: Nose normal.   Eyes:      Conjunctiva/sclera: Conjunctivae normal.   Cardiovascular:      Rate and Rhythm: Normal rate.   Pulmonary:      Effort: Pulmonary effort is normal.      Breath sounds: No wheezing or rales.   Abdominal:      Palpations: Abdomen is soft.      Tenderness: There is no abdominal tenderness.   Musculoskeletal:      Cervical back: Normal range of motion.   Skin:     Findings: No rash.   Neurological:      Mental Status: He is alert and oriented to person, place, and time.   Psychiatric:         Mood and Affect: Mood and affect normal.         Judgment: Judgment normal.     Ortho Exam  Left elbow: Well-healing surgical incision without erythema dehiscence or purulent drainage.  Patient has very limited flexion and extension of the elbow due to stiffness.  Mild swelling of the elbow.  Unable to fully supinate or pronate.  She has good range of motion of the remaining upper extremity, sensation light touch is intact, radial pulses 2+.  Result Review :            Imaging Results (Most Recent)     Procedure Component " Value Units Date/Time    XR Elbow 2 View Left [[id number]] Resulted: 08/18/21 1516     Updated: 08/18/21 1516    Narrative:      X-Ray Report:  Study: X-rays ordered, taken in the office, and reviewed today  Site: Left elbow xray  Indication: Pain  View: AP and Lateral view(s)  Findings: Hardware intact, mild soft tissue swelling, no acute osseous   abnormalities or malalignment from prior  Prior studies available for comparison: yes                   Assessment and Plan    Problem List Items Addressed This Visit        Musculoskeletal and Injuries    Closed fracture of left elbow    Overview     Added automatically from request for surgery [id number]         Left elbow pain - Primary    Relevant Medications    ibuprofen (ADVIL,MOTRIN) 600 MG tablet    Other Relevant Orders    XR Elbow 2 View Left (Completed)    Aftercare following surgery of left olecranon ORIF    Current Assessment & Plan     I discussed this patient with Dr. Acuna.  She will remain in a hinged brace fully unlocked.  I discussed with the patient that she should wear the brace anytime she leaves the house and at home she should be working on range of motion as demonstrated in the clinic throughout the day.  She will begin physical therapy and we will follow up in 3 to 4 weeks for recheck.  X-ray at next visit.               Follow Up   Return in about 4 weeks (around 9/15/2021) for Recheck.  Patient Instructions   I discussed this patient with Dr. Acuna.  She will remain in a hinged brace fully unlocked.  I discussed with the patient that she should wear the brace anytime she leaves the house and at home she should be working on range of motion as demonstrated in the clinic throughout the day.  She will begin physical therapy and we will follow up in 3 to 4 weeks for recheck.  X-ray at next visi    Patient was given instructions and counseling regarding her condition or for health maintenance advice. Please see specific information pulled into the AVS if  appropriate.

## 2021-08-18 NOTE — PATIENT INSTRUCTIONS
I discussed this patient with Dr. Acuna.  She will remain in a hinged brace fully unlocked.  I discussed with the patient that she should wear the brace anytime she leaves the house and at home she should be working on range of motion as demonstrated in the clinic throughout the day.  She will begin physical therapy and we will follow up in 3 to 4 weeks for recheck.  X-ray at next visi

## 2021-08-18 NOTE — ASSESSMENT & PLAN NOTE
I discussed this patient with Dr. Acuna.  She will remain in a hinged brace fully unlocked.  I discussed with the patient that she should wear the brace anytime she leaves the house and at home she should be working on range of motion as demonstrated in the clinic throughout the day.  She will begin physical therapy and we will follow up in 3 to 4 weeks for recheck.  X-ray at next visit.

## 2021-08-30 ENCOUNTER — TREATMENT (OUTPATIENT)
Dept: PHYSICAL THERAPY | Facility: CLINIC | Age: 36
End: 2021-08-30

## 2021-08-30 DIAGNOSIS — S52.009B: Primary | ICD-10-CM

## 2021-08-30 DIAGNOSIS — M25.522 LEFT ELBOW PAIN: ICD-10-CM

## 2021-08-30 DIAGNOSIS — M25.622 STIFFNESS OF LEFT ELBOW JOINT: ICD-10-CM

## 2021-08-30 PROCEDURE — 97166 OT EVAL MOD COMPLEX 45 MIN: CPT | Performed by: OCCUPATIONAL THERAPIST

## 2021-08-30 PROCEDURE — 97110 THERAPEUTIC EXERCISES: CPT | Performed by: OCCUPATIONAL THERAPIST

## 2021-08-30 PROCEDURE — 97014 ELECTRIC STIMULATION THERAPY: CPT | Performed by: OCCUPATIONAL THERAPIST
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
## 2021-09-08 ENCOUNTER — TREATMENT (OUTPATIENT)
Dept: PHYSICAL THERAPY | Facility: CLINIC | Age: 36
End: 2021-09-08

## 2021-09-08 DIAGNOSIS — M25.622 STIFFNESS OF LEFT ELBOW JOINT: ICD-10-CM

## 2021-09-08 DIAGNOSIS — S52.009B: Primary | ICD-10-CM

## 2021-09-08 DIAGNOSIS — M25.522 LEFT ELBOW PAIN: ICD-10-CM

## 2021-09-08 PROCEDURE — 97110 THERAPEUTIC EXERCISES: CPT | Performed by: OCCUPATIONAL THERAPIST

## 2021-09-08 PROCEDURE — 97014 ELECTRIC STIMULATION THERAPY: CPT | Performed by: OCCUPATIONAL THERAPIST

## 2021-09-08 NOTE — PROGRESS NOTES
Occupational Therapy Daily Treatment Note      Patient: Tona Porter   : 1985  Referring practitioner: ORION Diaz  Date of Initial Visit: Type: THERAPY  Noted: 2021  Today's Date: 2021  Patient seen for 2 sessions    ICD-10-CM ICD-9-CM   1. Open fracture of proximal end of ulna without additional fracture  S52.009B 813.14   2. Left elbow pain  M25.522 719.42   3. Stiffness of left elbow joint  M25.622 719.52          Tona Porter reports 6/10 pain today. Pt reports soreness with exercises, but going well.      Objective   See Exercise, Manual, and Modality Logs for complete treatment.       Assessment/Plan        Cont per POC           Timed:  Manual Therapy:    0     mins  20178;  Therapeutic Exercise:    30     mins  05344;     Neuromuscular Andrew:    0    mins  98526;    Ultrasound:     0     mins  56766;    Electrical Stimulation:    0     mins  48484;    Untimed:  Electrical Stimulation:    10     mins  24871 ( );  Fluidotherapy:        0    mins  81200    Timed Treatment:   40   mins   Total Treatment:     40   mins  YUDITH Cruz, OTR/L,CHT  Occupational Therapist, Certified Hand Therapist

## 2021-09-09 ENCOUNTER — TELEPHONE (OUTPATIENT)
Dept: PHYSICAL THERAPY | Facility: CLINIC | Age: 36
End: 2021-09-09

## 2021-09-14 ENCOUNTER — TREATMENT (OUTPATIENT)
Dept: PHYSICAL THERAPY | Facility: CLINIC | Age: 36
End: 2021-09-14

## 2021-09-14 DIAGNOSIS — S52.009B: Primary | ICD-10-CM

## 2021-09-14 DIAGNOSIS — Z47.89 AFTERCARE FOLLOWING SURGERY OF THE MUSCULOSKELETAL SYSTEM: ICD-10-CM

## 2021-09-14 DIAGNOSIS — M25.622 STIFFNESS OF LEFT ELBOW JOINT: ICD-10-CM

## 2021-09-14 DIAGNOSIS — S42.402D CLOSED FRACTURE OF LEFT ELBOW WITH ROUTINE HEALING, SUBSEQUENT ENCOUNTER: ICD-10-CM

## 2021-09-14 DIAGNOSIS — S52.022A OLECRANON FRACTURE, LEFT, CLOSED, INITIAL ENCOUNTER: ICD-10-CM

## 2021-09-14 DIAGNOSIS — G90.511 COMPLEX REGIONAL PAIN SYNDROME TYPE 1 OF RIGHT UPPER EXTREMITY: ICD-10-CM

## 2021-09-14 DIAGNOSIS — M25.522 LEFT ELBOW PAIN: ICD-10-CM

## 2021-09-14 PROCEDURE — 97014 ELECTRIC STIMULATION THERAPY: CPT | Performed by: OCCUPATIONAL THERAPIST

## 2021-09-14 PROCEDURE — 97110 THERAPEUTIC EXERCISES: CPT | Performed by: OCCUPATIONAL THERAPIST

## 2021-09-14 NOTE — PROGRESS NOTES
Occupational Therapy Daily Treatment Note      Patient: Tona Porter   : 1985  Referring practitioner: ORION Diaz  Date of Initial Visit: Type: THERAPY  Noted: 2021  Today's Date: 2021  Patient seen for 3 sessions    ICD-10-CM ICD-9-CM   1. Open fracture of proximal end of ulna without additional fracture  S52.009B 813.14   2. Left elbow pain  M25.522 719.42   3. Stiffness of left elbow joint  M25.622 719.52          Tona Porter reports I am really aching today 4/10.       Objective   See Exercise, Manual, and Modality Logs for complete treatment.   Elbow   Pro 60  Sup 80    Assessment/Plan  Pro/sup and elbow flexion and extension is improving.  Pt able to use L UE to fix hair.      Cont per POC           Timed:  Manual Therapy:    5     mins  62125;  Therapeutic Exercise:    15     mins  17039;     Neuromuscular Andrew:    0    mins  32432;    Ultrasound:     0     mins  55823;    Electrical Stimulation:    0     mins  36562;    Untimed:  Electrical Stimulation:    10     mins  21315 ( );  Fluidotherapy:        0    mins  17782    Timed Treatment:   20   mins   Total Treatment:     30   mins  YUDITH Cruz, OTR/L,CHT  Occupational Therapist, Certified Hand Therapist

## 2021-09-15 ENCOUNTER — OFFICE VISIT (OUTPATIENT)
Dept: ORTHOPEDIC SURGERY | Facility: CLINIC | Age: 36
End: 2021-09-15

## 2021-09-15 VITALS — RESPIRATION RATE: 14 BRPM | HEIGHT: 62 IN | BODY MASS INDEX: 27.6 KG/M2 | WEIGHT: 150 LBS

## 2021-09-15 DIAGNOSIS — M25.522 LEFT ELBOW PAIN: Primary | ICD-10-CM

## 2021-09-15 DIAGNOSIS — Z47.89 AFTERCARE FOLLOWING SURGERY OF THE MUSCULOSKELETAL SYSTEM: ICD-10-CM

## 2021-09-15 PROCEDURE — 99024 POSTOP FOLLOW-UP VISIT: CPT | Performed by: PHYSICIAN ASSISTANT

## 2021-09-15 RX ORDER — CYCLOBENZAPRINE HCL 10 MG
10 TABLET ORAL DAILY
COMMUNITY
Start: 2021-08-27

## 2021-09-15 RX ORDER — GABAPENTIN 100 MG/1
CAPSULE ORAL
COMMUNITY
Start: 2021-08-27

## 2021-09-15 NOTE — PROGRESS NOTES
"Chief Complaint  Pain of the Left Elbow    Subjective          Tona Porter presents to [facility] for follow-up on left elbow status post left comminuted olecranon fracture ORIF by Dr. Acuna 07/15/2021.  Patient states she is doing well, she presents today wearing her elbow brace.  She has been doing PT few times a week and home exercises throughout the day.  She states she still has some swelling over the back of her elbow.  She states that her range of motion with flexion has improved by 25 degrees since her last visit.  She presents today with a report from her physical therapist.    Objective   Allergies   Allergen Reactions   • Penicillins Seizure     States can take keflex       Vital Signs:   Resp 14   Ht 157.5 cm (62\")   Wt 68 kg (150 lb)   BMI 27.44 kg/m²       Physical Exam  Constitutional:       Appearance: Normal appearance. Patient is well-developed and normal weight.   HENT:      Head: Normocephalic.      Right Ear: Hearing and external ear normal.      Left Ear: Hearing and external ear normal.      Nose: Nose normal.   Eyes:      Conjunctiva/sclera: Conjunctivae normal.   Cardiovascular:      Rate and Rhythm: Normal rate.   Pulmonary:      Effort: Pulmonary effort is normal.      Breath sounds: No wheezing or rales.   Abdominal:      Palpations: Abdomen is soft.      Tenderness: There is no abdominal tenderness.   Musculoskeletal:      Cervical back: Normal range of motion.   Skin:     Findings: No rash.   Neurological:      Mental Status: Patient is alert and oriented to person, place, and time.   Psychiatric:         Mood and Affect: Mood and affect normal.         Judgment: Judgment normal.     Ortho Exam  Left elbow: Well-healing surgical incision without erythema dehiscence or purulent drainage.  Moderate soft tissue swelling over the left olecranon.  Patient has extension to 25 in flexion to 115.  Sensation light touch intact, good range of motion left " wrist and digits,  strength symmetric and within normal limits, good thumb opposition, able to make a tight fist, radial pulse 2+.  Result Review :            Imaging Results (Most Recent)     Procedure Component Value Units Date/Time    XR Elbow 2 View Left [[id number]] Resulted: 09/15/21 1436     Updated: 09/15/21 1436    Narrative:      X-Ray Report:  Study: X-rays ordered, taken in the office, and reviewed today  Site: Left elbow xray  Indication: Pain  View: AP and Lateral view(s)  Findings: Hardware intact, mild soft tissue swelling over the olecranon,   no acute osseous abnormalities or malalignment, good bony healing is   appreciated compared to prior study  Prior studies available for comparison: yes                   Assessment and Plan    Problem List Items Addressed This Visit        Musculoskeletal and Injuries    Left elbow pain - Primary    Relevant Orders    XR Elbow 2 View Left (Completed)    Miscellaneous DME    Miscellaneous DME    Aftercare following surgery of left olecranon ORIF    Current Assessment & Plan     Brace to remain fully unlocked.  Continue PT and home exercises.  Follow-up in 4 to 6 weeks for recheck with x-ray at that visit.               Follow Up   Return in about 4 weeks (around 10/13/2021) for Recheck.  Patient Instructions   Brace to remain fully unlocked.  Continue PT and home exercises.  Follow-up in 4 to 6 weeks for recheck with x-ray at that visit.    Patient was given instructions and counseling regarding her condition or for health maintenance advice. Please see specific information pulled into the AVS if appropriate.

## 2021-09-15 NOTE — PATIENT INSTRUCTIONS
Brace to remain fully unlocked.  Continue PT and home exercises.  Follow-up in 4 to 6 weeks for recheck with x-ray at that visit.

## 2021-09-16 ENCOUNTER — TREATMENT (OUTPATIENT)
Dept: PHYSICAL THERAPY | Facility: CLINIC | Age: 36
End: 2021-09-16

## 2021-09-16 DIAGNOSIS — S52.009B: Primary | ICD-10-CM

## 2021-09-16 DIAGNOSIS — M25.522 LEFT ELBOW PAIN: ICD-10-CM

## 2021-09-16 DIAGNOSIS — M25.622 STIFFNESS OF LEFT ELBOW JOINT: ICD-10-CM

## 2021-09-16 PROCEDURE — 97110 THERAPEUTIC EXERCISES: CPT | Performed by: OCCUPATIONAL THERAPIST

## 2021-09-16 PROCEDURE — 97014 ELECTRIC STIMULATION THERAPY: CPT | Performed by: OCCUPATIONAL THERAPIST

## 2021-09-16 PROCEDURE — 97140 MANUAL THERAPY 1/> REGIONS: CPT | Performed by: OCCUPATIONAL THERAPIST

## 2021-09-16 NOTE — PROGRESS NOTES
Occupational Therapy Daily Treatment Note      Patient: Tona Porter   : 1985  Referring practitioner: ORION Diaz  Date of Initial Visit: Type: THERAPY  Noted: 2021  Today's Date: 2021  Patient seen for 4 sessions    ICD-10-CM ICD-9-CM   1. Open fracture of proximal end of ulna without additional fracture  S52.009B 813.14   2. Left elbow pain  M25.522 719.42   3. Stiffness of left elbow joint  M25.622 719.52          Tona Porter reports my elbow is healing.  I really need to get it better.  4/10      Objective   See Exercise, Manual, and Modality Logs for complete treatment.       Assessment/Plan  End feel with elbow extension is getting more stiff.      Cont per POC           Timed:  Manual Therapy:    15     mins  55575;  Therapeutic Exercise:    15     mins  16878;     Neuromuscular Andrew:    0    mins  65641;    Ultrasound:     0     mins  54774;    Electrical Stimulation:    0     mins  03342;    Untimed:  Electrical Stimulation:    10     mins  81967 ( );  Fluidotherapy:        0    mins  20887    Timed Treatment:   30   mins   Total Treatment:     40   mins  YUDITH Cruz, OTR/L,CHT  Occupational Therapist, Certified Hand Therapist

## 2021-09-20 ENCOUNTER — TREATMENT (OUTPATIENT)
Dept: PHYSICAL THERAPY | Facility: CLINIC | Age: 36
End: 2021-09-20

## 2021-09-20 DIAGNOSIS — M25.522 LEFT ELBOW PAIN: ICD-10-CM

## 2021-09-20 DIAGNOSIS — M25.622 STIFFNESS OF LEFT ELBOW JOINT: ICD-10-CM

## 2021-09-20 DIAGNOSIS — S52.009B: Primary | ICD-10-CM

## 2021-09-20 PROCEDURE — 97140 MANUAL THERAPY 1/> REGIONS: CPT | Performed by: OCCUPATIONAL THERAPIST

## 2021-09-20 PROCEDURE — 97110 THERAPEUTIC EXERCISES: CPT | Performed by: OCCUPATIONAL THERAPIST

## 2021-09-20 NOTE — PROGRESS NOTES
Occupational Therapy Daily Treatment Note      Patient: Tona Porter   : 1985  Referring practitioner: ORION Diaz  Date of Initial Visit: Type: THERAPY  Noted: 2021  Today's Date: 2021  Patient seen for 5 sessions    ICD-10-CM ICD-9-CM   1. Open fracture of proximal end of ulna without additional fracture  S52.009B 813.14   2. Left elbow pain  M25.522 719.42   3. Stiffness of left elbow joint  M25.622 719.52          Tona Porter reports I stretched myself over the weekend, so I was a little sore.  0/10 pain today      Objective   See Exercise, Manual, and Modality Logs for complete treatment.   Elbow AROM     Assessment/Plan    Pt reports increased functional use of L UE for household tasks.     Cont per POC           Timed:  Manual Therapy:    10     mins  91239;  Therapeutic Exercise:    20     mins  86722;     Neuromuscular Andrew:    0    mins  93030;    Ultrasound:     0     mins  43502;    Electrical Stimulation:    0     mins  69192;    Untimed:  Electrical Stimulation:    0     mins  76047 ( );  Fluidotherapy:        0    mins  12145    Timed Treatment:   30   mins   Total Treatment:     30   mins  YUDITH Cruz, OTR/L,CHT  Occupational Therapist, Certified Hand Therapist

## 2021-09-28 ENCOUNTER — TREATMENT (OUTPATIENT)
Dept: PHYSICAL THERAPY | Facility: CLINIC | Age: 36
End: 2021-09-28

## 2021-09-28 DIAGNOSIS — S52.009B: Primary | ICD-10-CM

## 2021-09-28 DIAGNOSIS — M25.522 LEFT ELBOW PAIN: ICD-10-CM

## 2021-09-28 DIAGNOSIS — M25.622 STIFFNESS OF LEFT ELBOW JOINT: ICD-10-CM

## 2021-09-28 PROCEDURE — 97140 MANUAL THERAPY 1/> REGIONS: CPT | Performed by: OCCUPATIONAL THERAPIST

## 2021-09-28 PROCEDURE — 97110 THERAPEUTIC EXERCISES: CPT | Performed by: OCCUPATIONAL THERAPIST

## 2021-09-28 NOTE — PROGRESS NOTES
Re-Assessment / Re-Certification      Patient: Tona Porter   : 1985  Diagnosis/ICD-10 Code:  Open fracture of proximal end of ulna without additional fracture [S52.009B]  Referring practitioner: ORION Diaz  Date of Initial Visit: Type: THERAPY  Noted: 2021  Today's Date: 2021  Patient seen for 6 sessions      Subjective:   Tona Porter reports: I have been aching more lately, I think it has to do with the cooler weather.  I also think it feels warm to the touch. 8/10 pain  Subjective Questionnaire: QuickDASH: 29  Clinical Progress: improved  Home Program Compliance: Yes  Treatment has included: therapeutic exercise, neuromuscular re-education, manual therapy, electrical stimulation and moist heat    Subjective   Objective          Active Range of Motion     Left Elbow   Flexion: 125 degrees   Extension: 25 degrees   Forearm supination: 70 degrees   Forearm pronation: 60 degrees     Strength/Myotome Testing     Additional Strength Details  L 33#      Assessment/Plan    Visit Diagnoses:    ICD-10-CM ICD-9-CM   1. Open fracture of proximal end of ulna without additional fracture  S52.009B 813.14   2. Left elbow pain  M25.522 719.42   3. Stiffness of left elbow joint  M25.622 719.52       Progress toward previous goals: Partially Met    Plan Goals: 1. The patient complains of pain in the L elbow                  LTG 1: 12 weeks:  The patient will report a pain rating of 0/10 or better in order to improve sleep quality and tolerance to performance of activities of daily living.                                  STATUS:  NOT MET                  STG 1a: 4weeks:  The patient will report a pain rating of 6/10 or better.                                   STATUS:   NOT MET  2. The patient has limited ROM of the L elbow                  LTG 2: 12 weeks:  The patient will demonstrate  degrees of HOUGH  to allow the patient to do hair.                                  STATUS:  NOT  MET                  STG 2a: 4 weeks:  The patient will demonstrate 25-130TAM degrees of HOUGH of elbow.                                  STATUS: MET                         3. The patient has limited strength of the L UE.                  LTG 3: 12 weeks:  The patient will demonstrate 40# in order to return to PLOF.                                  STATUS:  NOT MET                  STG 3a: 4 weeks:  The patient will demonstrate tolerance to light strengthening.                                  STATUS:  MET    4. Carrying, Moving, and Handling Objects Functional Limitation                                   LTG 4: 12 weeks:  The patient will demonstrate 0% limitation by achieving a score of 11 on the Quick DASH.                                  STATUS:  NOT MET                  STG 4a: 4 weeks:  The patient will demonstrate 40-59% limitation by achieving a score of 30 on the Quick DASH.                                    STATUS:  MET                    TREATMENT: Orthotic fabrication/fitting/management and training, Manual therapy, therapeutic exercise, home exercise instruction, and modalities as needed to include: electrical stimulation, ultrasound, moist heat, paraffin, fluidotherapy and ice.      Recommendations: Continue as planned  Timeframe: 2 months  Prognosis to achieve goals: good    OT Signature: Talisha Koo OTD, OTR/L, CHT      Based upon review of the patient's progress and continued therapy plan, it is my medical opinion that Tona Porter should continue occupational therapy treatment at [facility]  [address] [place] [address]  [place] [address] [phone].    Signature: __________________________________  Negar Woo PA  Timed:  Manual Therapy:    15     mins  96253;  Therapeutic Exercise:    25     mins  09049;     Neuromuscular Andrew:    0    mins  55758;    Ultrasound:     0     mins  52627;    Electrical Stimulation:    0     mins   57045;    Untimed:  Electrical Stimulation:    0     mins  83452 ( );  Fluidotherapy:     0     mins  23653    Timed Treatment:   40   mins   Total Treatment:     40   mins

## 2021-09-30 ENCOUNTER — TREATMENT (OUTPATIENT)
Dept: PHYSICAL THERAPY | Facility: CLINIC | Age: 36
End: 2021-09-30

## 2021-09-30 DIAGNOSIS — M25.522 LEFT ELBOW PAIN: ICD-10-CM

## 2021-09-30 DIAGNOSIS — M25.622 STIFFNESS OF LEFT ELBOW JOINT: ICD-10-CM

## 2021-09-30 DIAGNOSIS — S52.009B: Primary | ICD-10-CM

## 2021-09-30 PROCEDURE — 97140 MANUAL THERAPY 1/> REGIONS: CPT | Performed by: OCCUPATIONAL THERAPIST

## 2021-09-30 PROCEDURE — 97110 THERAPEUTIC EXERCISES: CPT | Performed by: OCCUPATIONAL THERAPIST

## 2021-09-30 NOTE — PROGRESS NOTES
Occupational Therapy Daily Treatment Note      Patient: Tona Porter   : 1985  Referring practitioner: ORION Diaz  Date of Initial Visit: Type: THERAPY  Noted: 2021  Today's Date: 2021  Patient seen for 7 sessions    ICD-10-CM ICD-9-CM   1. Open fracture of proximal end of ulna without additional fracture  S52.009B 813.14   2. Left elbow pain  M25.522 719.42   3. Stiffness of left elbow joint  M25.622 719.52          Tona Porter reports I feel much better today     Objective   See Exercise, Manual, and Modality Logs for complete treatment.   PROM with elbow mobs increased elbow AROM by 20 degrees in flexion   after PROM,  prior to PROM    Assessment/Plan    Gaining AROM and decreased pain    Cont per POC           Timed:  Manual Therapy:    15     mins  81675;  Therapeutic Exercise:    15     mins  44506;     Neuromuscular Andrew:    0    mins  36316;    Ultrasound:     0     mins  97755;    Electrical Stimulation:    0     mins  02054;    Untimed:  Electrical Stimulation:    0     mins  63748 ( );  Fluidotherapy:        0    mins  61636    Timed Treatment:   30   mins   Total Treatment:     30   mins  YUDITH Cruz, OTR/L,CHT  Occupational Therapist, Certified Hand Therapist

## 2021-10-05 ENCOUNTER — TREATMENT (OUTPATIENT)
Dept: PHYSICAL THERAPY | Facility: CLINIC | Age: 36
End: 2021-10-05

## 2021-10-05 DIAGNOSIS — M25.522 LEFT ELBOW PAIN: ICD-10-CM

## 2021-10-05 DIAGNOSIS — S52.009B: Primary | ICD-10-CM

## 2021-10-05 DIAGNOSIS — M25.622 STIFFNESS OF LEFT ELBOW JOINT: ICD-10-CM

## 2021-10-05 PROCEDURE — 97110 THERAPEUTIC EXERCISES: CPT | Performed by: OCCUPATIONAL THERAPIST

## 2021-10-05 PROCEDURE — 97140 MANUAL THERAPY 1/> REGIONS: CPT | Performed by: OCCUPATIONAL THERAPIST

## 2021-10-05 NOTE — PROGRESS NOTES
Occupational Therapy Daily Treatment Note      Patient: Tona Porter   : 1985  Referring practitioner: ORION Diaz  Date of Initial Visit: Type: THERAPY  Noted: 2021  Today's Date: 10/5/2021  Patient seen for 8 sessions    ICD-10-CM ICD-9-CM   1. Open fracture of proximal end of ulna without additional fracture  S52.009B 813.14   2. Left elbow pain  M25.522 719.42   3. Stiffness of left elbow joint  M25.622 719.52          Tona Porter reports bringing my arm to my face is getting better, it is the outwards (extension) that is still difficult.      Objective   See Exercise, Manual, and Modality Logs for complete treatment.   PROM with mobilizations has increased AROM into extension     Assessment/Plan  Strength is improving, patient is tolerating functional use of L UE well this date.       Cont per POC           Timed:  Manual Therapy:    10     mins  64274;  Therapeutic Exercise:    20     mins  76582;     Neuromuscular Andrew:    0    mins  14638;    Ultrasound:     0     mins  18294;    Electrical Stimulation:    0     mins  76189;    Untimed:  Electrical Stimulation:    0     mins  64889 ( );  Fluidotherapy:        0    mins  40070    Timed Treatment:   30   mins   Total Treatment:     30   mins  YUDITH Cruz, BRADFORDR/L,CHT  Occupational Therapist, Certified Hand Therapist

## 2021-10-07 ENCOUNTER — TREATMENT (OUTPATIENT)
Dept: PHYSICAL THERAPY | Facility: CLINIC | Age: 36
End: 2021-10-07

## 2021-10-07 DIAGNOSIS — M25.622 STIFFNESS OF LEFT ELBOW JOINT: ICD-10-CM

## 2021-10-07 DIAGNOSIS — S52.009B: Primary | ICD-10-CM

## 2021-10-07 DIAGNOSIS — M25.522 LEFT ELBOW PAIN: ICD-10-CM

## 2021-10-07 PROCEDURE — 97140 MANUAL THERAPY 1/> REGIONS: CPT | Performed by: OCCUPATIONAL THERAPIST

## 2021-10-07 PROCEDURE — 97110 THERAPEUTIC EXERCISES: CPT | Performed by: OCCUPATIONAL THERAPIST

## 2021-10-07 NOTE — PROGRESS NOTES
Occupational Therapy Daily Treatment Note      Patient: Tona Porter   : 1985  Referring practitioner: ORION Diaz  Date of Initial Visit: Type: THERAPY  Noted: 2021  Today's Date: 10/7/2021  Patient seen for 9 sessions    ICD-10-CM ICD-9-CM   1. Open fracture of proximal end of ulna without additional fracture  S52.009B 813.14   2. Left elbow pain  M25.522 719.42   3. Stiffness of left elbow joint  M25.622 719.52          Tona Porter reports it is getting better, I have been lifting things at home.     Objective   See Exercise, Manual, and Modality Logs for complete treatment.       Assessment/Plan  Pt progressing with strengthening and elbow extension      Cont per POC           Timed:  Manual Therapy:    10     mins  06246;  Therapeutic Exercise:    20     mins  81924;     Neuromuscular Andrew:    0    mins  29593;    Ultrasound:     0     mins  90943;    Electrical Stimulation:    0     mins  91607;    Untimed:  Electrical Stimulation:    0     mins  99030 ( );  Fluidotherapy:        0    mins  16541    Timed Treatment:   30   mins   Total Treatment:     30   mins  YUDITH Cruz, OTR/L,CHT  Occupational Therapist, Certified Hand Therapist

## 2021-10-12 ENCOUNTER — TELEPHONE (OUTPATIENT)
Dept: PHYSICAL THERAPY | Facility: CLINIC | Age: 36
End: 2021-10-12

## 2021-10-14 ENCOUNTER — TELEPHONE (OUTPATIENT)
Dept: ORTHOPEDICS | Facility: OTHER | Age: 36
End: 2021-10-14

## 2021-11-10 ENCOUNTER — TREATMENT (OUTPATIENT)
Dept: PHYSICAL THERAPY | Facility: CLINIC | Age: 36
End: 2021-11-10

## 2021-11-10 DIAGNOSIS — M25.522 LEFT ELBOW PAIN: ICD-10-CM

## 2021-11-10 DIAGNOSIS — S52.009B: Primary | ICD-10-CM

## 2021-11-10 PROCEDURE — 97110 THERAPEUTIC EXERCISES: CPT | Performed by: OCCUPATIONAL THERAPIST

## 2021-11-10 PROCEDURE — 97530 THERAPEUTIC ACTIVITIES: CPT | Performed by: OCCUPATIONAL THERAPIST

## 2021-11-10 NOTE — PROGRESS NOTES
Re-Assessment / Discharge      Patient: Tona Porter   : 1985  Diagnosis/ICD-10 Code:  No primary diagnosis found.  Referring practitioner: ORION Diaz  Date of Initial Visit: Type: THERAPY  Noted: 2021  Today's Date: 11/10/2021  Patient seen for 10 sessions      Subjective:   Tona Porter reports: I have moved.  I am happy with how my elbow is doing.  No real pain.   Subjective Questionnaire: QuickDASH: 17  Clinical Progress: improved  Home Program Compliance: Yes  Treatment has included: therapeutic exercise, neuromuscular re-education, manual therapy and therapeutic activity    Subjective   Objective          Active Range of Motion     Left Elbow   Flexion: 140 degrees   Extension: 15 degrees   Forearm supination: 80 degrees   Forearm pronation: 60 degrees     Strength/Myotome Testing     Additional Strength Details  35#      Assessment/Plan    Visit Diagnoses:  No diagnosis found.    Progress toward previous goals: Partially Met    Plan Goals: 1. The patient complains of pain in the L elbow                  LTG 1: 12 weeks:  The patient will report a pain rating of 0/10 or better in order to improve sleep quality and tolerance to performance of activities of daily living.                                  STATUS: MET                  STG 1a: 4weeks:  The patient will report a pain rating of 6/10 or better.                                   STATUS:   MET  2. The patient has limited ROM of the L elbow                  LTG 2: 12 weeks:  The patient will demonstrate  degrees of HOUGH  to allow the patient to do hair.                                  STATUS:  NOT MET                  STG 2a: 4 weeks:  The patient will demonstrate 25-130TAM degrees of HOUGH of elbow.                                  STATUS: MET                         3. The patient has limited strength of the L UE.                  LTG 3: 12 weeks:  The patient will demonstrate 40# in order to return to  PLOF.                                  STATUS:  NOT MET                  STG 3a: 4 weeks:  The patient will demonstrate tolerance to light strengthening.                                  STATUS:  MET    4. Carrying, Moving, and Handling Objects Functional Limitation                                   LTG 4: 12 weeks:  The patient will demonstrate 0% limitation by achieving a score of 11 on the Quick DASH.                                  STATUS:  NOT MET                  STG 4a: 4 weeks:  The patient will demonstrate 40-59% limitation by achieving a score of 30 on the Quick DASH.                                    STATUS:  MET      Recommendations: Discharge, pt reports that she has a lot of priorities right now and has not been able to attend therapy in last month.  Pt would like to continue with HEP.  Prognosis to achieve goals: good      OT SIGNATURE: YUDITH Cruz, OTR/L, CHT     Electronically signed    [place] LICENSE: [id number]       Timed:  Manual Therapy:    0     mins  56782;  Therapeutic Exercise:    15     mins  30274;  Therapeutic Activity:    15     mins  59081;     Neuromuscular Andrew:    0    mins  69168;    Ultrasound:     0     mins  97350;    Electrical Stimulation:    0     mins  05046;    Untimed:  Electrical Stimulation:    0     mins  52048 ( );  Fluidotherapy:        0    mins  75580  Paraffin:                          0    mins  54915    Timed Treatment:   30   mins   Total Treatment:     30   mins

## 2022-02-18 ENCOUNTER — TRANSCRIBE ORDERS (OUTPATIENT)
Dept: GENERAL RADIOLOGY | Facility: HOSPITAL | Age: 37
End: 2022-02-18

## 2022-02-18 ENCOUNTER — HOSPITAL ENCOUNTER (OUTPATIENT)
Dept: GENERAL RADIOLOGY | Facility: HOSPITAL | Age: 37
Discharge: HOME | End: 2022-02-18
Admitting: NURSE PRACTITIONER

## 2022-02-18 DIAGNOSIS — M54.2 CERVICALGIA: ICD-10-CM

## 2022-02-18 DIAGNOSIS — M54.2 CERVICALGIA: Primary | ICD-10-CM

## 2022-02-18 PROCEDURE — 72050 X-RAY EXAM NECK SPINE 4/5VWS: CPT

## 2022-03-10 ENCOUNTER — OFFICE VISIT (OUTPATIENT)
Dept: OBSTETRICS AND GYNECOLOGY | Facility: CLINIC | Age: 37
End: 2022-03-10

## 2022-03-10 VITALS
SYSTOLIC BLOOD PRESSURE: 121 MMHG | BODY MASS INDEX: 26.87 KG/M2 | DIASTOLIC BLOOD PRESSURE: 81 MMHG | HEART RATE: 71 BPM | WEIGHT: 146 LBS | HEIGHT: 62 IN

## 2022-03-10 DIAGNOSIS — Z11.3 SCREEN FOR STD (SEXUALLY TRANSMITTED DISEASE): ICD-10-CM

## 2022-03-10 DIAGNOSIS — N93.9 ABNORMAL UTERINE BLEEDING (AUB): Primary | ICD-10-CM

## 2022-03-10 DIAGNOSIS — N94.6 DYSMENORRHEA: ICD-10-CM

## 2022-03-10 LAB
B-HCG UR QL: NEGATIVE
C TRACH RRNA CVX QL NAA+PROBE: NOT DETECTED
CANDIDA SPECIES: NEGATIVE
DEPRECATED RDW RBC AUTO: 42.3 FL (ref 37–54)
ERYTHROCYTE [DISTWIDTH] IN BLOOD BY AUTOMATED COUNT: 12.2 % (ref 12.3–15.4)
EXPIRATION DATE: NORMAL
GARDNERELLA VAGINALIS: POSITIVE
HBV SURFACE AG SERPL QL IA: NORMAL
HCT VFR BLD AUTO: 44.6 % (ref 34–46.6)
HGB BLD-MCNC: 14.3 G/DL (ref 12–15.9)
INTERNAL NEGATIVE CONTROL: NORMAL
INTERNAL POSITIVE CONTROL: NORMAL
Lab: NORMAL
MCH RBC QN AUTO: 29.8 PG (ref 26.6–33)
MCHC RBC AUTO-ENTMCNC: 32.1 G/DL (ref 31.5–35.7)
MCV RBC AUTO: 92.9 FL (ref 79–97)
N GONORRHOEA RRNA SPEC QL NAA+PROBE: DETECTED
PLATELET # BLD AUTO: 276 10*3/MM3 (ref 140–450)
PMV BLD AUTO: 10.7 FL (ref 6–12)
RBC # BLD AUTO: 4.8 10*6/MM3 (ref 3.77–5.28)
T VAGINALIS DNA VAG QL PROBE+SIG AMP: POSITIVE
WBC NRBC COR # BLD: 8.72 10*3/MM3 (ref 3.4–10.8)

## 2022-03-10 PROCEDURE — 87591 N.GONORRHOEAE DNA AMP PROB: CPT | Performed by: NURSE PRACTITIONER

## 2022-03-10 PROCEDURE — 84439 ASSAY OF FREE THYROXINE: CPT | Performed by: NURSE PRACTITIONER

## 2022-03-10 PROCEDURE — 86592 SYPHILIS TEST NON-TREP QUAL: CPT | Performed by: NURSE PRACTITIONER

## 2022-03-10 PROCEDURE — 86803 HEPATITIS C AB TEST: CPT | Performed by: NURSE PRACTITIONER

## 2022-03-10 PROCEDURE — 85027 COMPLETE CBC AUTOMATED: CPT | Performed by: NURSE PRACTITIONER

## 2022-03-10 PROCEDURE — 99204 OFFICE O/P NEW MOD 45 MIN: CPT | Performed by: NURSE PRACTITIONER

## 2022-03-10 PROCEDURE — 81025 URINE PREGNANCY TEST: CPT | Performed by: NURSE PRACTITIONER

## 2022-03-10 PROCEDURE — 83001 ASSAY OF GONADOTROPIN (FSH): CPT | Performed by: NURSE PRACTITIONER

## 2022-03-10 PROCEDURE — 84443 ASSAY THYROID STIM HORMONE: CPT | Performed by: NURSE PRACTITIONER

## 2022-03-10 PROCEDURE — 87491 CHLMYD TRACH DNA AMP PROBE: CPT | Performed by: NURSE PRACTITIONER

## 2022-03-10 PROCEDURE — G0432 EIA HIV-1/HIV-2 SCREEN: HCPCS | Performed by: NURSE PRACTITIONER

## 2022-03-10 PROCEDURE — 87480 CANDIDA DNA DIR PROBE: CPT | Performed by: NURSE PRACTITIONER

## 2022-03-10 PROCEDURE — 87660 TRICHOMONAS VAGIN DIR PROBE: CPT | Performed by: NURSE PRACTITIONER

## 2022-03-10 PROCEDURE — 87340 HEPATITIS B SURFACE AG IA: CPT | Performed by: NURSE PRACTITIONER

## 2022-03-10 PROCEDURE — 87510 GARDNER VAG DNA DIR PROBE: CPT | Performed by: NURSE PRACTITIONER

## 2022-03-10 NOTE — PROGRESS NOTES
"GYN Problem/Follow Up Visit    Chief Complaint   Patient presents with   • Follow-up     Irreg jorge/ cramping/           HPI  Tona Porter is a 36 y.o. female, , who presents for menstrual changes, last for 2 months dec 2021, 2022. No menses February or March. On heaviest days change products within 1 hour. Menstrual cramps were severe, stayed a lot in the bed.   Desires std screen.   40 pound weight gain over the past year  Experiencing fatigue       Additional OB/GYN History   Patient's last menstrual period was 01/10/2022 (approximate).  Current contraception: contraceptive methods: Tubal ligation  Allergies : Penicillins     The additional following portions of the patient's history were reviewed and updated as appropriate: allergies, current medications, past family history, past medical history, past social history, past surgical history and problem list.    Review of Systems    I have reviewed and agree with the HPI, ROS, and historical information as entered above. Eda Gambino, APRN    Objective   /81   Pulse 71   Ht 157.5 cm (62\")   Wt 66.2 kg (146 lb)   LMP 01/10/2022 (Approximate)   Breastfeeding No   BMI 26.70 kg/m²     Physical Exam  Vitals and nursing note reviewed. Exam conducted with a chaperone present.   Constitutional:       Appearance: Normal appearance.   Neck:      Thyroid: No thyromegaly.   Cardiovascular:      Rate and Rhythm: Normal rate and regular rhythm.      Heart sounds: Normal heart sounds.   Pulmonary:      Effort: Pulmonary effort is normal.      Breath sounds: Normal breath sounds.   Abdominal:      General: There is no distension.      Palpations: Abdomen is soft.      Tenderness: There is no right CVA tenderness or left CVA tenderness.   Genitourinary:     General: Normal vulva.      Vagina: Normal.      Cervix: Normal.      Uterus: Normal. Tender (throughout).       Adnexa: Right adnexa normal and left adnexa normal.   Lymphadenopathy:    "   Lower Body: No right inguinal adenopathy. No left inguinal adenopathy.   Skin:     General: Skin is warm and dry.   Neurological:      Mental Status: She is alert and oriented to person, place, and time.          Assessment and Plan    Diagnoses and all orders for this visit:    1. Abnormal uterine bleeding (AUB) (Primary)  -     CBC (No Diff)  -     T4, Free  -     TSH  -     US Non-ob Transvaginal; Future  -     Follicle Stimulating Hormone  -     POC Pregnancy, Urine    2. Dysmenorrhea  -     US Non-ob Transvaginal; Future    3. Screen for STD (sexually transmitted disease)  -     Gardnerella vaginalis, Trichomonas vaginalis, Candida albicans, DNA - Swab, Vagina  -     Chlamydia trachomatis, Neisseria gonorrhoeae, PCR - Swab, Cervix  -     RPR, Rfx Qn RPR / Confirm TP  -     Hepatitis B Surface Antigen  -     Hepatitis C Antibody  -     HIV-1 / O / 2 Ag / Antibody 4th Generation      HCG, Urine, QL   Date Value Ref Range Status   03/10/2022 Negative Negative Final      Counseling:  • Safe Sex/Condoms         She understands the importance of having the above orders performed in a timely fashion.  The risks of not performing them include, but are not limited to, cancer and/or subsequent increase in morbidity and/or mortality.  She is encouraged to review her results online and/or contact or office if she has questions.     Follow Up:  Return for well woman exam in 3 weeks./Results        BEBE Ibanez  03/10/2022

## 2022-03-11 LAB
FSH SERPL-ACNC: 3.83 MIU/ML
HCV AB SER DONR QL: NORMAL
HIV1+2 AB SER QL: NORMAL
T4 FREE SERPL-MCNC: 1.27 NG/DL (ref 0.93–1.7)
TSH SERPL DL<=0.05 MIU/L-ACNC: 1.47 UIU/ML (ref 0.27–4.2)

## 2022-03-12 LAB — RPR SER QL: NON REACTIVE

## 2022-03-14 ENCOUNTER — TELEPHONE (OUTPATIENT)
Dept: OBSTETRICS AND GYNECOLOGY | Facility: CLINIC | Age: 37
End: 2022-03-14

## 2022-03-14 DIAGNOSIS — A54.9 GONORRHEA: Primary | ICD-10-CM

## 2022-03-14 RX ORDER — CEFTRIAXONE 1 G/1
500 INJECTION, POWDER, FOR SOLUTION INTRAMUSCULAR; INTRAVENOUS ONCE
Status: CANCELLED | OUTPATIENT
Start: 2022-03-14 | End: 2022-03-14

## 2022-03-14 RX ORDER — METRONIDAZOLE 500 MG/1
500 TABLET ORAL 2 TIMES DAILY
Qty: 14 TABLET | Refills: 0 | Status: SHIPPED | OUTPATIENT
Start: 2022-03-14 | End: 2022-03-21

## 2022-03-14 NOTE — TELEPHONE ENCOUNTER
Patient advised testing was positive for BV, trich, and gonorrhea.  Patient advised RX sent to pharmacy for BV& trich, but GC treatment involves an injection of antibiotic. Patient given phone number for scheduling the injection. Advised no intercourse until after treatment. Advised partner needed to get  With health department to get treatment. Patient set up for TA in 6 weeks.

## 2022-03-14 NOTE — TELEPHONE ENCOUNTER
----- Message from BEBE Ibanez sent at 3/14/2022  1:23 PM EDT -----  Treatment for bv and trichomoniasis sent to pharmacy, pt to report to [facility] for treatment for gonorrhea, rocephin IM injection. Partner should be treated, report to health department

## 2022-03-18 ENCOUNTER — HOSPITAL ENCOUNTER (OUTPATIENT)
Dept: INFUSION THERAPY | Facility: HOSPITAL | Age: 37
Discharge: HOME | End: 2022-03-18

## 2022-03-18 ENCOUNTER — HOSPITAL ENCOUNTER (OUTPATIENT)
Dept: ULTRASOUND IMAGING | Facility: HOSPITAL | Age: 37
Discharge: HOME | End: 2022-03-18

## 2022-03-18 VITALS
DIASTOLIC BLOOD PRESSURE: 78 MMHG | HEIGHT: 62 IN | RESPIRATION RATE: 16 BRPM | SYSTOLIC BLOOD PRESSURE: 109 MMHG | WEIGHT: 144.84 LBS | OXYGEN SATURATION: 100 % | BODY MASS INDEX: 26.65 KG/M2 | HEART RATE: 80 BPM | TEMPERATURE: 98.6 F

## 2022-03-18 DIAGNOSIS — N94.6 DYSMENORRHEA: ICD-10-CM

## 2022-03-18 DIAGNOSIS — A54.9 GONORRHEA: Primary | ICD-10-CM

## 2022-03-18 DIAGNOSIS — N93.9 ABNORMAL UTERINE BLEEDING (AUB): ICD-10-CM

## 2022-03-18 PROCEDURE — 96372 THER/PROPH/DIAG INJ SC/IM: CPT

## 2022-03-18 PROCEDURE — 76830 TRANSVAGINAL US NON-OB: CPT

## 2022-03-18 PROCEDURE — 0 LIDOCAINE 1 % SOLUTION: Performed by: NURSE PRACTITIONER

## 2022-03-18 PROCEDURE — 25010000002 CEFTRIAXONE PER 250 MG: Performed by: NURSE PRACTITIONER

## 2022-03-18 RX ORDER — CEFTRIAXONE 500 MG/1
500 INJECTION, POWDER, FOR SOLUTION INTRAMUSCULAR; INTRAVENOUS ONCE
Status: CANCELLED | OUTPATIENT
Start: 2022-03-18 | End: 2022-03-18

## 2022-03-18 RX ORDER — LIDOCAINE HYDROCHLORIDE 10 MG/ML
1 INJECTION, SOLUTION INFILTRATION; PERINEURAL ONCE
Status: COMPLETED | OUTPATIENT
Start: 2022-03-18 | End: 2022-03-18

## 2022-03-18 RX ORDER — CEFTRIAXONE 500 MG/1
500 INJECTION, POWDER, FOR SOLUTION INTRAMUSCULAR; INTRAVENOUS ONCE
Status: COMPLETED | OUTPATIENT
Start: 2022-03-18 | End: 2022-03-18

## 2022-03-18 RX ADMIN — CEFTRIAXONE SODIUM 500 MG: 500 INJECTION, POWDER, FOR SOLUTION INTRAMUSCULAR; INTRAVENOUS at 09:27

## 2022-03-18 RX ADMIN — LIDOCAINE HYDROCHLORIDE 1 ML: 10 INJECTION, SOLUTION INFILTRATION; PERINEURAL at 09:27

## 2022-03-22 ENCOUNTER — TELEPHONE (OUTPATIENT)
Dept: OBSTETRICS AND GYNECOLOGY | Facility: CLINIC | Age: 37
End: 2022-03-22

## 2022-03-22 NOTE — TELEPHONE ENCOUNTER
----- Message from BEBE Ibanez sent at 3/22/2022 11:19 AM EDT -----  Pelvic ultrasound and bloodwork looks ok, if irregular menstruation and pelvic tenderness continue after treatment for std, fu for further evaluation and management.

## 2022-03-22 NOTE — TELEPHONE ENCOUNTER
Discussed results with pt, adv blood work and us normal. Adv if irr menses and pelvic tenderness continues after treatment for STD we will further evaluate.

## 2022-03-28 ENCOUNTER — OFFICE VISIT (OUTPATIENT)
Dept: OBSTETRICS AND GYNECOLOGY | Facility: CLINIC | Age: 37
End: 2022-03-28

## 2022-03-28 VITALS
DIASTOLIC BLOOD PRESSURE: 82 MMHG | HEART RATE: 75 BPM | SYSTOLIC BLOOD PRESSURE: 118 MMHG | WEIGHT: 147 LBS | BODY MASS INDEX: 26.05 KG/M2 | HEIGHT: 63 IN

## 2022-03-28 DIAGNOSIS — Z12.11 SCREENING FOR COLON CANCER: ICD-10-CM

## 2022-03-28 DIAGNOSIS — Z01.419 WELL WOMAN EXAM: Primary | ICD-10-CM

## 2022-03-28 PROCEDURE — 87624 HPV HI-RISK TYP POOLED RSLT: CPT | Performed by: NURSE PRACTITIONER

## 2022-03-28 PROCEDURE — G0123 SCREEN CERV/VAG THIN LAYER: HCPCS | Performed by: NURSE PRACTITIONER

## 2022-03-28 PROCEDURE — 2014F MENTAL STATUS ASSESS: CPT | Performed by: NURSE PRACTITIONER

## 2022-03-28 PROCEDURE — 99395 PREV VISIT EST AGE 18-39: CPT | Performed by: NURSE PRACTITIONER

## 2022-03-28 PROCEDURE — 3008F BODY MASS INDEX DOCD: CPT | Performed by: NURSE PRACTITIONER

## 2022-03-28 RX ORDER — DESVENLAFAXINE 25 MG/1
TABLET, EXTENDED RELEASE ORAL
COMMUNITY
Start: 2022-03-26

## 2022-03-28 RX ORDER — AMITRIPTYLINE HYDROCHLORIDE 10 MG/1
10 TABLET, FILM COATED ORAL
COMMUNITY
Start: 2022-03-25

## 2022-03-28 RX ORDER — BUSPIRONE HYDROCHLORIDE 5 MG/1
5 TABLET ORAL 3 TIMES DAILY
COMMUNITY
Start: 2022-02-09

## 2022-03-28 RX ORDER — MELOXICAM 15 MG/1
15 TABLET ORAL
COMMUNITY
Start: 2022-01-29

## 2022-03-28 NOTE — PROGRESS NOTES
"  HPI:   36 y.o..Presents for well woman exam. Contraception or HRT: Contraception:  Tubal ligation  Menses:   Regulated after treatment for gonnorrhea, q month, lasts 7 days, changes products q 3 hrs on heaviest days.   Pain:  None  Last pap normal   Complaints: Pt has no complaints today.  Patient reports that she is not currently experiencing any symptoms of urinary incontinence.      Past Medical History:   Diagnosis Date   • Depression    • RSD upper limb    • Vitamin D deficiency       Past Surgical History:   Procedure Laterality Date   • DENTAL PROCEDURE     • ELBOW OPEN REDUCTION INTERNAL FIXATION Left 7/15/2021    Procedure: ELBOW OPEN REDUCTION INTERNAL FIXATION;  Surgeon: Arnie Acuna MD;  [place] [facility] [place] [facility] [place]  Service: [place]opedics;  Laterality: Left;   • TUBAL ABDOMINAL LIGATION     • WISDOM TOOTH EXTRACTION        Family History   Problem Relation Age of Onset   • Colon cancer Mother 56   • Malig Hyperthermia Neg Hx         PCP: does manage PMHx and preventative labs    PHYSICAL EXAM: Chaperone present /82   Pulse 75   Ht 160 cm (63\")   Wt 66.7 kg (147 lb)   LMP 2022   Breastfeeding No   BMI 26.04 kg/m²   General- NAD, alert and oriented, appropriate  Psych- Normal mood, good memory  Neck- No masses, no thyroid enlargement  Lymphatic- No palpable neck, axillary, or groin nodes  CV- Regular rhythm, no murmurs  Resp- CTA to bases, no wheezes  Abdomen- Soft, non distended, non tender, no masses  Breast left-  Bilaterally symmetrical, no masses, non tender, no nipple discharge  Breast right- Bilaterally symmetrical, no masses, non tender, no nipple discharge  External genitalia- Normal female, no lesions  Urethra/meatus- Normal, no masses, non tender, no prolapse  Bladder- Normal, no masses, non tender, no prolapse  Vagina- Normal, no atrophy, no lesions, no discharge, no prolapse  Cvx- Normal, no lesions, no discharge, No cervical motion tenderness  Uterus- Normal size, " shape & consistency.  Non tender, mobile, & no prolapse  Adnexa- No mass, non tender  Anus/Rectum/Perineum- Not performed  Ext- No edema, no cyanosis    Skin- No lesions, no rashes, no acanthosis nigricans       ASSESSMENT and PLAN:    Diagnoses and all orders for this visit:    1. Well woman exam (Primary)  -     IGP,rfx Aptima HPV All Pth    2. Screening for colon cancer  -     Ambulatory Referral For Screening Colonoscopy    Preventative:   BREAST HEALTH- Monthly self breast exam importance and how to reviewed. MMG and/or MRI (prn) reviewed per society guidelines and her individual history. Screen: Pt declines.  She has been educated as to my strong recommendation for this screening test.  Without testing she understands the risk of increased morbidity and mortality.  She is well informed, her questions have been answered and she still declines.  CERVICAL CANCER Screening- Reviewed current ASCCP guidelines for screening w and wo cotest HPV, age specific.  Screen: Updated today  COLON CANCER Screening- Reviewed current medical society guidelines and options.  Screen:  referral to gastro to discuss screening colonoscopy, mother diagnosed with colon cancer this year  SMOKING STATUS- pt does use nicotine and/or tobacco.  I reviewed importance of avoiding.  Options to quit provided per [employer] protocols.  Recommend FU w PCP regarding quitting options if unable to quit wo assistance.    Follow up PCP/Specialist PMHx and Labs  Myriad: Does not qualify.  Tobacco cessation reviewed.    She understands the importance of having any ordered tests to be performed in a timely fashion.  The risks of not performing them include, but are not limited to, advanced cancer stages, bone loss from osteoporosis and/or subsequent increase in morbidity and/or mortality.  She is encouraged to review her results online and/or contact or office if she has questions.     Follow Up:  Return as needed.        Eda Gambino, APRN  03/28/2022

## 2022-04-08 ENCOUNTER — TELEPHONE (OUTPATIENT)
Dept: OBSTETRICS AND GYNECOLOGY | Facility: CLINIC | Age: 37
End: 2022-04-08

## 2022-04-08 LAB
CONV .: ABNORMAL
CYTOLOGIST CVX/VAG CYTO: ABNORMAL
CYTOLOGY CVX/VAG DOC CYTO: ABNORMAL
CYTOLOGY CVX/VAG DOC THIN PREP: ABNORMAL
DX ICD CODE: ABNORMAL
DX ICD CODE: ABNORMAL
HIV 1 & 2 AB SER-IMP: ABNORMAL
HPV I/H RISK 4 DNA CVX QL PROBE+SIG AMP: NEGATIVE
OTHER STN SPEC: ABNORMAL
PATHOLOGIST CVX/VAG CYTO: ABNORMAL
RECOM F/U CVX/VAG CYTO: ABNORMAL
STAT OF ADQ CVX/VAG CYTO-IMP: ABNORMAL

## 2022-04-08 NOTE — TELEPHONE ENCOUNTER
Patient advised pap smear was ASCUS, HPV negative and that pap smear was recommended in one year. Patient verbalized understanding.

## 2022-04-08 NOTE — TELEPHONE ENCOUNTER
----- Message from BEBE Ibanez sent at 4/8/2022 12:32 PM EDT -----  ASCUS, HPV-, recent vaginal infection, recommend repeat pap 1 year

## 2022-05-03 ENCOUNTER — TELEPHONE (OUTPATIENT)
Dept: GASTROENTEROLOGY | Facility: CLINIC | Age: 37
End: 2022-05-03

## 2022-05-03 ENCOUNTER — PREP FOR SURGERY (OUTPATIENT)
Dept: OTHER | Facility: HOSPITAL | Age: 37
End: 2022-05-03

## 2022-05-03 ENCOUNTER — CLINICAL SUPPORT (OUTPATIENT)
Dept: GASTROENTEROLOGY | Facility: CLINIC | Age: 37
End: 2022-05-03

## 2022-05-03 DIAGNOSIS — Z12.11 COLON CANCER SCREENING: Primary | ICD-10-CM

## 2022-05-03 RX ORDER — GABAPENTIN 400 MG/1
CAPSULE ORAL
COMMUNITY
Start: 2022-03-25

## 2022-05-03 NOTE — TELEPHONE ENCOUNTER
Tona Porter  REASON FOR CALL encounter for colon screening  SENT IN PREP master  Past Medical History:   Diagnosis Date   • Depression    • RSD upper limb    • Vitamin D deficiency      Allergies   Allergen Reactions   • Penicillins Seizure and Other (See Comments)     States can take keflex  States can take keflex     Past Surgical History:   Procedure Laterality Date   • DENTAL PROCEDURE     • ELBOW OPEN REDUCTION INTERNAL FIXATION Left 7/15/2021    Procedure: ELBOW OPEN REDUCTION INTERNAL FIXATION;  Surgeon: Arnie Acuna MD;  Location: [facility] [place];  Service: Orthopedics;  Laterality: Left;   • TUBAL ABDOMINAL LIGATION     • WISDOM TOOTH EXTRACTION       Social History     Socioeconomic History   • Marital status: Single   Tobacco Use   • Smoking status: Current Every Day Smoker     Packs/day: 0.50     Years: 5.00     Pack years: 2.50     Types: Cigarettes     Start date: 6/9/2009   • Smokeless tobacco: Never Used   Vaping Use   • Vaping Use: Never used   Substance and Sexual Activity   • Alcohol use: Not Currently   • Drug use: Never   • Sexual activity: Not Currently     Birth control/protection: Tubal ligation     Family History   Problem Relation Age of Onset   • Colon cancer Mother 56   • Malig Hyperthermia Neg Hx        Current Outpatient Medications:   •  amitriptyline (ELAVIL) 10 MG tablet, Take 10 mg by mouth every night at bedtime., Disp: , Rfl:   •  busPIRone (BUSPAR) 5 MG tablet, Take 5 mg by mouth 3 (Three) Times a Day., Disp: , Rfl:   •  cyclobenzaprine (FLEXERIL) 10 MG tablet, Take 10 mg by mouth Daily., Disp: , Rfl:   •  Desvenlafaxine Succinate ER 25 MG tablet sustained-release 24 hour, , Disp: , Rfl:   •  escitalopram (LEXAPRO) 10 MG tablet, Take 10 mg by mouth Daily., Disp: , Rfl:   •  gabapentin (NEURONTIN) 100 MG capsule, , Disp: , Rfl:   •  gabapentin (NEURONTIN) 400 MG capsule, , Disp: , Rfl:   •  HYDROcodone-acetaminophen (NORCO) 5-325 MG per tablet, Take 1 tablet by  mouth Every 6 (Six) Hours As Needed (AS NEEDED FOR POST OPERATIVE PAIN)., Disp: 28 tablet, Rfl: 0  •  ibuprofen (ADVIL,MOTRIN) 600 MG tablet, Take 1 tablet by mouth Every 8 (Eight) Hours As Needed for Mild Pain ., Disp: 30 tablet, Rfl: 0  •  meloxicam (MOBIC) 15 MG tablet, Take 15 mg by mouth., Disp: , Rfl:   •  ondansetron ODT (ZOFRAN-ODT) 4 MG disintegrating tablet, Place 1 tablet on the tongue Every 6 (Six) Hours As Needed for Nausea or Vomiting., Disp: 10 tablet, Rfl: 0  •  vitamin D (ERGOCALCIFEROL) 1.25 MG (86980 UT) capsule capsule, TAKE 1 CAPSULE BY MOUTH TWICE A WEEK AS DIRECTED, Disp: , Rfl:

## 2022-05-03 NOTE — PROGRESS NOTES
SPOKE WITH PT ON A DATE FOR COLONOSCOPY OF 9/12/22 . MADE SURE CHART WAS UP TO DATE. WENT OVER PREP AND MAILED OUT INSTRUCTIONS. PUT IN ORDER FOR COLON AND SENT PREP TO PHARMACY

## 2022-05-17 ENCOUNTER — TELEPHONE (OUTPATIENT)
Dept: GASTROENTEROLOGY | Facility: CLINIC | Age: 37
End: 2022-05-17

## 2022-05-17 NOTE — TELEPHONE ENCOUNTER
Patient called and left a  requesting a return call to r/s procedure.   Patient has been r/s for 09/21/22 with an arrival time of 1030.   Patient aware of date and time.

## 2022-06-03 ENCOUNTER — OFFICE VISIT (OUTPATIENT)
Dept: OBSTETRICS AND GYNECOLOGY | Facility: CLINIC | Age: 37
End: 2022-06-03

## 2022-06-03 VITALS
WEIGHT: 138.6 LBS | HEIGHT: 63 IN | BODY MASS INDEX: 24.56 KG/M2 | HEART RATE: 71 BPM | DIASTOLIC BLOOD PRESSURE: 89 MMHG | SYSTOLIC BLOOD PRESSURE: 128 MMHG

## 2022-06-03 DIAGNOSIS — A54.9 GONORRHEA: Primary | ICD-10-CM

## 2022-06-03 DIAGNOSIS — A59.01 TRICHOMONAL VAGINITIS: ICD-10-CM

## 2022-06-03 LAB
C TRACH RRNA CVX QL NAA+PROBE: NOT DETECTED
CANDIDA SPECIES: NEGATIVE
GARDNERELLA VAGINALIS: POSITIVE
N GONORRHOEA RRNA SPEC QL NAA+PROBE: NOT DETECTED
T VAGINALIS DNA VAG QL PROBE+SIG AMP: NEGATIVE

## 2022-06-03 PROCEDURE — 87660 TRICHOMONAS VAGIN DIR PROBE: CPT | Performed by: NURSE PRACTITIONER

## 2022-06-03 PROCEDURE — 87480 CANDIDA DNA DIR PROBE: CPT | Performed by: NURSE PRACTITIONER

## 2022-06-03 PROCEDURE — 87510 GARDNER VAG DNA DIR PROBE: CPT | Performed by: NURSE PRACTITIONER

## 2022-06-03 PROCEDURE — 87591 N.GONORRHOEAE DNA AMP PROB: CPT | Performed by: NURSE PRACTITIONER

## 2022-06-03 PROCEDURE — 99213 OFFICE O/P EST LOW 20 MIN: CPT | Performed by: NURSE PRACTITIONER

## 2022-06-03 PROCEDURE — 87491 CHLMYD TRACH DNA AMP PROBE: CPT | Performed by: NURSE PRACTITIONER

## 2022-06-03 NOTE — PROGRESS NOTES
"GYN Problem/Follow Up Visit    Chief Complaint   Patient presents with   • TA CHL           HPI  Tona Porter is a 36 y.o. female, , who presents for test of cure Gonorrhea and Trichomonas infection, detected 3 months ago, treated, no symptoms, partner was informed, no longer with partner       Additional OB/GYN History   Patient's last menstrual period was 2022.  Current contraception: contraceptive methods: Tubal ligation  Allergies : Penicillins     The additional following portions of the patient's history were reviewed and updated as appropriate: allergies, current medications, past family history, past medical history, past social history, past surgical history and problem list.    Review of Systems    I have reviewed and agree with the HPI, ROS, and historical information as entered above. Eda Gambino, APRN    Objective   /89   Pulse 71   Ht 160 cm (63\")   Wt 62.9 kg (138 lb 9.6 oz)   LMP 2022   Breastfeeding No   BMI 24.55 kg/m²     Physical Exam  Vitals and nursing note reviewed. Exam conducted with a chaperone present.   Constitutional:       Appearance: Normal appearance.   Neck:      Thyroid: No thyromegaly.   Cardiovascular:      Rate and Rhythm: Normal rate.   Pulmonary:      Effort: Pulmonary effort is normal.   Abdominal:      General: There is no distension.      Palpations: Abdomen is soft.   Genitourinary:     General: Normal vulva.      Vagina: Bleeding (menstrual) present.      Cervix: Cervical bleeding (menstrual) present.      Uterus: Normal.       Adnexa: Right adnexa normal and left adnexa normal.   Lymphadenopathy:      Lower Body: No right inguinal adenopathy. No left inguinal adenopathy.   Skin:     General: Skin is warm and dry.   Neurological:      Mental Status: She is alert and oriented to person, place, and time.            Assessment and Plan    Diagnoses and all orders for this visit:    1. Gonorrhea (Primary)  -     Chlamydia " trachomatis, Neisseria gonorrhoeae, PCR - Swab, Cervix    2. Trichomonal vaginitis  -     Gardnerella vaginalis, Trichomonas vaginalis, Candida albicans, DNA - Swab, Vagina        Counseling:  • Safe Sex/Condoms        She understands the importance of having the above orders performed in a timely fashion.  The risks of not performing them include, but are not limited to, cancer and/or subsequent increase in morbidity and/or mortality.  She is encouraged to review her results online and/or contact or office if she has questions.     Follow Up:  Return if symptoms worsen or fail to improve.        Eda Gambino, APRN  06/03/2022

## 2022-06-06 RX ORDER — METRONIDAZOLE 500 MG/1
500 TABLET ORAL 2 TIMES DAILY
Qty: 14 TABLET | Refills: 0 | Status: SHIPPED | OUTPATIENT
Start: 2022-06-06 | End: 2022-06-13

## 2022-09-07 ENCOUNTER — TELEPHONE (OUTPATIENT)
Dept: GASTROENTEROLOGY | Facility: CLINIC | Age: 37
End: 2022-09-07

## 2022-09-15 NOTE — TELEPHONE ENCOUNTER
Patient states that she would like to cancel procedure and call back at a later time to r/s. Procedure has been cancelled.

## 2022-11-16 ENCOUNTER — TRANSCRIBE ORDERS (OUTPATIENT)
Dept: GENERAL RADIOLOGY | Facility: HOSPITAL | Age: 37
End: 2022-11-16

## 2022-11-16 ENCOUNTER — HOSPITAL ENCOUNTER (OUTPATIENT)
Dept: GENERAL RADIOLOGY | Facility: HOSPITAL | Age: 37
Discharge: HOME | End: 2022-11-16
Admitting: NURSE PRACTITIONER

## 2022-11-16 DIAGNOSIS — M25.522 LEFT ELBOW PAIN: Primary | ICD-10-CM

## 2022-11-16 DIAGNOSIS — M25.522 LEFT ELBOW PAIN: ICD-10-CM

## 2022-11-16 PROCEDURE — 73080 X-RAY EXAM OF ELBOW: CPT

## 2022-11-17 ENCOUNTER — TELEPHONE (OUTPATIENT)
Dept: ORTHOPEDIC SURGERY | Facility: CLINIC | Age: 37
End: 2022-11-17

## 2022-11-17 NOTE — TELEPHONE ENCOUNTER
Provider: DR. CHADWICK    Caller: PATIENT    Relationship to Patient: SELF    Phone Number:  [phone]    Reason for Call:  PT. HAD LEFT ELBOW SURGERY LAST YEAR WITH DR. CHADWICK.  SHE SEES PAIN MANAGEMENT.   HER PAIN DOCTOR ORDERED AN XRAY- WAS DONE YESTERDAY AT [facility].  SHE WANTS TO TALK TO DR. CHADWICK ABOUT THE RESULTS.  PLEASE CALL TO ADVISE.

## 2023-01-27 ENCOUNTER — HOSPITAL ENCOUNTER (EMERGENCY)
Facility: HOSPITAL | Age: 38
Discharge: HOME | End: 2023-01-27
Attending: EMERGENCY MEDICINE | Admitting: EMERGENCY MEDICINE
Payer: COMMERCIAL

## 2023-01-27 ENCOUNTER — APPOINTMENT (OUTPATIENT)
Dept: GENERAL RADIOLOGY | Facility: HOSPITAL | Age: 38
End: 2023-01-27
Payer: COMMERCIAL

## 2023-01-27 VITALS
HEIGHT: 62 IN | RESPIRATION RATE: 17 BRPM | BODY MASS INDEX: 24.54 KG/M2 | SYSTOLIC BLOOD PRESSURE: 114 MMHG | DIASTOLIC BLOOD PRESSURE: 78 MMHG | TEMPERATURE: 99.3 F | WEIGHT: 133.38 LBS | HEART RATE: 72 BPM | OXYGEN SATURATION: 99 %

## 2023-01-27 DIAGNOSIS — J11.1 INFLUENZA-LIKE ILLNESS: ICD-10-CM

## 2023-01-27 DIAGNOSIS — D72.829 LEUKOCYTOSIS, UNSPECIFIED TYPE: Primary | ICD-10-CM

## 2023-01-27 LAB
ALBUMIN SERPL-MCNC: 4.5 G/DL (ref 3.5–5.2)
ALBUMIN/GLOB SERPL: 1.7 G/DL
ALP SERPL-CCNC: 89 U/L (ref 39–117)
ALT SERPL W P-5'-P-CCNC: 14 U/L (ref 1–33)
ANION GAP SERPL CALCULATED.3IONS-SCNC: 9.5 MMOL/L (ref 5–15)
AST SERPL-CCNC: 15 U/L (ref 1–32)
BACTERIA UR QL AUTO: ABNORMAL /HPF
BASOPHILS # BLD AUTO: 0.03 10*3/MM3 (ref 0–0.2)
BASOPHILS NFR BLD AUTO: 0.1 % (ref 0–1.5)
BILIRUB SERPL-MCNC: 0.3 MG/DL (ref 0–1.2)
BILIRUB UR QL STRIP: NEGATIVE
BUN SERPL-MCNC: 17 MG/DL (ref 6–20)
BUN/CREAT SERPL: 23.6 (ref 7–25)
CALCIUM SPEC-SCNC: 9.5 MG/DL (ref 8.6–10.5)
CHLORIDE SERPL-SCNC: 107 MMOL/L (ref 98–107)
CLARITY UR: CLEAR
CO2 SERPL-SCNC: 23.5 MMOL/L (ref 22–29)
COLOR UR: YELLOW
CREAT SERPL-MCNC: 0.72 MG/DL (ref 0.57–1)
DEPRECATED RDW RBC AUTO: 39.8 FL (ref 37–54)
EGFRCR SERPLBLD CKD-EPI 2021: 111.3 ML/MIN/1.73
EOSINOPHIL # BLD AUTO: 0.01 10*3/MM3 (ref 0–0.4)
EOSINOPHIL NFR BLD AUTO: 0 % (ref 0.3–6.2)
ERYTHROCYTE [DISTWIDTH] IN BLOOD BY AUTOMATED COUNT: 12.4 % (ref 12.3–15.4)
FLUAV AG NPH QL: NEGATIVE
FLUBV AG NPH QL IA: NEGATIVE
GLOBULIN UR ELPH-MCNC: 2.6 GM/DL
GLUCOSE SERPL-MCNC: 108 MG/DL (ref 65–99)
GLUCOSE UR STRIP-MCNC: NEGATIVE MG/DL
HCT VFR BLD AUTO: 37.5 % (ref 34–46.6)
HGB BLD-MCNC: 13 G/DL (ref 12–15.9)
HGB UR QL STRIP.AUTO: NEGATIVE
HYALINE CASTS UR QL AUTO: ABNORMAL /LPF
IMM GRANULOCYTES # BLD AUTO: 0.09 10*3/MM3 (ref 0–0.05)
IMM GRANULOCYTES NFR BLD AUTO: 0.4 % (ref 0–0.5)
KETONES UR QL STRIP: NEGATIVE
LEUKOCYTE ESTERASE UR QL STRIP.AUTO: ABNORMAL
LYMPHOCYTES # BLD AUTO: 2.33 10*3/MM3 (ref 0.7–3.1)
LYMPHOCYTES NFR BLD AUTO: 11.6 % (ref 19.6–45.3)
MCH RBC QN AUTO: 30.2 PG (ref 26.6–33)
MCHC RBC AUTO-ENTMCNC: 34.7 G/DL (ref 31.5–35.7)
MCV RBC AUTO: 87.2 FL (ref 79–97)
MONOCYTES # BLD AUTO: 1.09 10*3/MM3 (ref 0.1–0.9)
MONOCYTES NFR BLD AUTO: 5.4 % (ref 5–12)
NEUTROPHILS NFR BLD AUTO: 16.55 10*3/MM3 (ref 1.7–7)
NEUTROPHILS NFR BLD AUTO: 82.5 % (ref 42.7–76)
NITRITE UR QL STRIP: NEGATIVE
NRBC BLD AUTO-RTO: 0 /100 WBC (ref 0–0.2)
PH UR STRIP.AUTO: 6 [PH] (ref 5–8)
PLATELET # BLD AUTO: 308 10*3/MM3 (ref 140–450)
PMV BLD AUTO: 9.3 FL (ref 6–12)
POTASSIUM SERPL-SCNC: 4.1 MMOL/L (ref 3.5–5.2)
PROT SERPL-MCNC: 7.1 G/DL (ref 6–8.5)
PROT UR QL STRIP: NEGATIVE
RBC # BLD AUTO: 4.3 10*6/MM3 (ref 3.77–5.28)
RBC # UR STRIP: ABNORMAL /HPF
REF LAB TEST METHOD: ABNORMAL
S PYO AG THROAT QL: NEGATIVE
SODIUM SERPL-SCNC: 140 MMOL/L (ref 136–145)
SP GR UR STRIP: 1.03 (ref 1–1.03)
SQUAMOUS #/AREA URNS HPF: ABNORMAL /HPF
UROBILINOGEN UR QL STRIP: ABNORMAL
WBC # UR STRIP: ABNORMAL /HPF
WBC NRBC COR # BLD: 20.1 10*3/MM3 (ref 3.4–10.8)

## 2023-01-27 PROCEDURE — U0004 COV-19 TEST NON-CDC HGH THRU: HCPCS

## 2023-01-27 PROCEDURE — 71046 X-RAY EXAM CHEST 2 VIEWS: CPT

## 2023-01-27 PROCEDURE — C9803 HOPD COVID-19 SPEC COLLECT: HCPCS

## 2023-01-27 PROCEDURE — 99283 EMERGENCY DEPT VISIT LOW MDM: CPT

## 2023-01-27 PROCEDURE — 87804 INFLUENZA ASSAY W/OPTIC: CPT

## 2023-01-27 PROCEDURE — 87040 BLOOD CULTURE FOR BACTERIA: CPT | Performed by: REGISTERED NURSE

## 2023-01-27 PROCEDURE — 81001 URINALYSIS AUTO W/SCOPE: CPT | Performed by: REGISTERED NURSE

## 2023-01-27 PROCEDURE — 85025 COMPLETE CBC W/AUTO DIFF WBC: CPT | Performed by: REGISTERED NURSE

## 2023-01-27 PROCEDURE — 80053 COMPREHEN METABOLIC PANEL: CPT | Performed by: REGISTERED NURSE

## 2023-01-27 PROCEDURE — 87081 CULTURE SCREEN ONLY: CPT

## 2023-01-27 PROCEDURE — 36415 COLL VENOUS BLD VENIPUNCTURE: CPT

## 2023-01-27 PROCEDURE — 87880 STREP A ASSAY W/OPTIC: CPT

## 2023-01-28 LAB — SARS-COV-2 RNA PNL SPEC NAA+PROBE: NOT DETECTED

## 2023-01-30 LAB — BACTERIA SPEC AEROBE CULT: NORMAL

## 2023-02-01 LAB
BACTERIA SPEC AEROBE CULT: NORMAL
BACTERIA SPEC AEROBE CULT: NORMAL

## 2023-03-22 ENCOUNTER — HOSPITAL ENCOUNTER (EMERGENCY)
Facility: HOSPITAL | Age: 38
Discharge: HOME | End: 2023-03-22
Attending: EMERGENCY MEDICINE | Admitting: EMERGENCY MEDICINE
Payer: COMMERCIAL

## 2023-03-22 ENCOUNTER — APPOINTMENT (OUTPATIENT)
Dept: CT IMAGING | Facility: HOSPITAL | Age: 38
End: 2023-03-22
Payer: COMMERCIAL

## 2023-03-22 VITALS
HEIGHT: 62 IN | TEMPERATURE: 99.2 F | HEART RATE: 62 BPM | BODY MASS INDEX: 25.56 KG/M2 | RESPIRATION RATE: 16 BRPM | WEIGHT: 138.89 LBS | DIASTOLIC BLOOD PRESSURE: 75 MMHG | SYSTOLIC BLOOD PRESSURE: 112 MMHG | OXYGEN SATURATION: 100 %

## 2023-03-22 DIAGNOSIS — K50.10 SEGMENTAL COLITIS WITHOUT COMPLICATION: Primary | ICD-10-CM

## 2023-03-22 LAB
ALBUMIN SERPL-MCNC: 3.9 G/DL (ref 3.5–5.2)
ALBUMIN/GLOB SERPL: 1.4 G/DL
ALP SERPL-CCNC: 93 U/L (ref 39–117)
ALT SERPL W P-5'-P-CCNC: 13 U/L (ref 1–33)
ANION GAP SERPL CALCULATED.3IONS-SCNC: 11.7 MMOL/L (ref 5–15)
AST SERPL-CCNC: 13 U/L (ref 1–32)
BACTERIA UR QL AUTO: ABNORMAL /HPF
BASOPHILS # BLD AUTO: 0.04 10*3/MM3 (ref 0–0.2)
BASOPHILS NFR BLD AUTO: 0.3 % (ref 0–1.5)
BILIRUB SERPL-MCNC: 0.3 MG/DL (ref 0–1.2)
BILIRUB UR QL STRIP: NEGATIVE
BUN SERPL-MCNC: 13 MG/DL (ref 6–20)
BUN/CREAT SERPL: 17.8 (ref 7–25)
CALCIUM SPEC-SCNC: 9.1 MG/DL (ref 8.6–10.5)
CHLORIDE SERPL-SCNC: 104 MMOL/L (ref 98–107)
CLARITY UR: CLEAR
CO2 SERPL-SCNC: 23.3 MMOL/L (ref 22–29)
COLOR UR: YELLOW
CREAT SERPL-MCNC: 0.73 MG/DL (ref 0.57–1)
DEPRECATED RDW RBC AUTO: 42 FL (ref 37–54)
EGFRCR SERPLBLD CKD-EPI 2021: 109.5 ML/MIN/1.73
EOSINOPHIL # BLD AUTO: 0.12 10*3/MM3 (ref 0–0.4)
EOSINOPHIL NFR BLD AUTO: 0.9 % (ref 0.3–6.2)
ERYTHROCYTE [DISTWIDTH] IN BLOOD BY AUTOMATED COUNT: 12.9 % (ref 12.3–15.4)
GLOBULIN UR ELPH-MCNC: 2.7 GM/DL
GLUCOSE SERPL-MCNC: 148 MG/DL (ref 65–99)
GLUCOSE UR STRIP-MCNC: ABNORMAL MG/DL
HCG INTACT+B SERPL-ACNC: <0.5 MIU/ML
HCT VFR BLD AUTO: 38.2 % (ref 34–46.6)
HGB BLD-MCNC: 12.7 G/DL (ref 12–15.9)
HGB UR QL STRIP.AUTO: ABNORMAL
HOLD SPECIMEN: NORMAL
HOLD SPECIMEN: NORMAL
HYALINE CASTS UR QL AUTO: ABNORMAL /LPF
IMM GRANULOCYTES # BLD AUTO: 0.05 10*3/MM3 (ref 0–0.05)
IMM GRANULOCYTES NFR BLD AUTO: 0.4 % (ref 0–0.5)
KETONES UR QL STRIP: NEGATIVE
LEUKOCYTE ESTERASE UR QL STRIP.AUTO: ABNORMAL
LIPASE SERPL-CCNC: 26 U/L (ref 13–60)
LYMPHOCYTES # BLD AUTO: 2.19 10*3/MM3 (ref 0.7–3.1)
LYMPHOCYTES NFR BLD AUTO: 15.6 % (ref 19.6–45.3)
MCH RBC QN AUTO: 29.6 PG (ref 26.6–33)
MCHC RBC AUTO-ENTMCNC: 33.2 G/DL (ref 31.5–35.7)
MCV RBC AUTO: 89 FL (ref 79–97)
MONOCYTES # BLD AUTO: 0.72 10*3/MM3 (ref 0.1–0.9)
MONOCYTES NFR BLD AUTO: 5.1 % (ref 5–12)
NEUTROPHILS NFR BLD AUTO: 10.91 10*3/MM3 (ref 1.7–7)
NEUTROPHILS NFR BLD AUTO: 77.7 % (ref 42.7–76)
NITRITE UR QL STRIP: NEGATIVE
NRBC BLD AUTO-RTO: 0 /100 WBC (ref 0–0.2)
PH UR STRIP.AUTO: 5.5 [PH] (ref 5–8)
PLATELET # BLD AUTO: 254 10*3/MM3 (ref 140–450)
PMV BLD AUTO: 9.3 FL (ref 6–12)
POTASSIUM SERPL-SCNC: 3.5 MMOL/L (ref 3.5–5.2)
PROT SERPL-MCNC: 6.6 G/DL (ref 6–8.5)
PROT UR QL STRIP: NEGATIVE
RBC # BLD AUTO: 4.29 10*6/MM3 (ref 3.77–5.28)
RBC # UR STRIP: ABNORMAL /HPF
REF LAB TEST METHOD: ABNORMAL
SODIUM SERPL-SCNC: 139 MMOL/L (ref 136–145)
SP GR UR STRIP: 1.02 (ref 1–1.03)
SQUAMOUS #/AREA URNS HPF: ABNORMAL /HPF
UROBILINOGEN UR QL STRIP: ABNORMAL
WBC # UR STRIP: ABNORMAL /HPF
WBC NRBC COR # BLD: 14.03 10*3/MM3 (ref 3.4–10.8)
WHOLE BLOOD HOLD COAG: NORMAL
WHOLE BLOOD HOLD SPECIMEN: NORMAL

## 2023-03-22 PROCEDURE — 80053 COMPREHEN METABOLIC PANEL: CPT | Performed by: EMERGENCY MEDICINE

## 2023-03-22 PROCEDURE — 85025 COMPLETE CBC W/AUTO DIFF WBC: CPT | Performed by: EMERGENCY MEDICINE

## 2023-03-22 PROCEDURE — 83690 ASSAY OF LIPASE: CPT | Performed by: EMERGENCY MEDICINE

## 2023-03-22 PROCEDURE — 96374 THER/PROPH/DIAG INJ IV PUSH: CPT

## 2023-03-22 PROCEDURE — 74177 CT ABD & PELVIS W/CONTRAST: CPT

## 2023-03-22 PROCEDURE — 96361 HYDRATE IV INFUSION ADD-ON: CPT

## 2023-03-22 PROCEDURE — 84702 CHORIONIC GONADOTROPIN TEST: CPT | Performed by: EMERGENCY MEDICINE

## 2023-03-22 PROCEDURE — 81001 URINALYSIS AUTO W/SCOPE: CPT | Performed by: EMERGENCY MEDICINE

## 2023-03-22 PROCEDURE — 25010000002 KETOROLAC TROMETHAMINE PER 15 MG: Performed by: NURSE PRACTITIONER

## 2023-03-22 PROCEDURE — 99283 EMERGENCY DEPT VISIT LOW MDM: CPT

## 2023-03-22 PROCEDURE — 25510000001 IOPAMIDOL PER 1 ML: Performed by: EMERGENCY MEDICINE

## 2023-03-22 RX ORDER — SODIUM CHLORIDE 0.9 % (FLUSH) 0.9 %
10 SYRINGE (ML) INJECTION AS NEEDED
Status: DISCONTINUED | OUTPATIENT
Start: 2023-03-22 | End: 2023-03-23 | Stop reason: HOSPADM

## 2023-03-22 RX ORDER — METRONIDAZOLE 500 MG/1
500 TABLET ORAL 2 TIMES DAILY
Qty: 14 TABLET | Refills: 0 | Status: SHIPPED | OUTPATIENT
Start: 2023-03-22 | End: 2023-03-29

## 2023-03-22 RX ORDER — LEVOFLOXACIN 500 MG/1
500 TABLET, FILM COATED ORAL ONCE
Status: COMPLETED | OUTPATIENT
Start: 2023-03-22 | End: 2023-03-22

## 2023-03-22 RX ORDER — ONDANSETRON 4 MG/1
4 TABLET, ORALLY DISINTEGRATING ORAL EVERY 8 HOURS PRN
Qty: 15 TABLET | Refills: 0 | Status: SHIPPED | OUTPATIENT
Start: 2023-03-22

## 2023-03-22 RX ORDER — METRONIDAZOLE 500 MG/1
500 TABLET ORAL ONCE
Status: COMPLETED | OUTPATIENT
Start: 2023-03-22 | End: 2023-03-22

## 2023-03-22 RX ORDER — DICYCLOMINE HYDROCHLORIDE 10 MG/1
20 CAPSULE ORAL ONCE
Status: COMPLETED | OUTPATIENT
Start: 2023-03-22 | End: 2023-03-22

## 2023-03-22 RX ORDER — DICYCLOMINE HCL 20 MG
20 TABLET ORAL EVERY 8 HOURS PRN
Qty: 30 TABLET | Refills: 0 | Status: SHIPPED | OUTPATIENT
Start: 2023-03-22

## 2023-03-22 RX ORDER — KETOROLAC TROMETHAMINE 30 MG/ML
30 INJECTION, SOLUTION INTRAMUSCULAR; INTRAVENOUS ONCE
Status: COMPLETED | OUTPATIENT
Start: 2023-03-22 | End: 2023-03-22

## 2023-03-22 RX ORDER — CIPROFLOXACIN 500 MG/1
500 TABLET, FILM COATED ORAL 2 TIMES DAILY
Qty: 14 TABLET | Refills: 0 | Status: SHIPPED | OUTPATIENT
Start: 2023-03-22 | End: 2023-03-29

## 2023-03-22 RX ADMIN — KETOROLAC TROMETHAMINE 30 MG: 30 INJECTION, SOLUTION INTRAMUSCULAR; INTRAVENOUS at 21:32

## 2023-03-22 RX ADMIN — DICYCLOMINE HYDROCHLORIDE 20 MG: 10 CAPSULE ORAL at 22:58

## 2023-03-22 RX ADMIN — SODIUM CHLORIDE 1000 ML: 9 INJECTION, SOLUTION INTRAVENOUS at 21:31

## 2023-03-22 RX ADMIN — IOPAMIDOL 97 ML: 755 INJECTION, SOLUTION INTRAVENOUS at 21:46

## 2023-03-22 RX ADMIN — METRONIDAZOLE 500 MG: 500 TABLET ORAL at 22:58

## 2023-03-22 RX ADMIN — LEVOFLOXACIN 500 MG: 500 TABLET, FILM COATED ORAL at 22:58

## 2023-03-23 NOTE — ED PROVIDER NOTES
Time: 8:03 PM EDT  Date of encounter:  3/22/2023  Independent Historian/Clinical History and Information was obtained by:   Patient  Chief Complaint: Abdominal pain    History is limited by: N/A    History of Present Illness:  Patient is a 37 y.o. year old female who presents to the emergency department for evaluation of abdominal pain      History provided by:  Patient  Abdominal Pain  Pain location:  RUQ and RLQ  Pain quality: aching and sharp    Pain radiates to:  Does not radiate  Pain severity:  Moderate  Onset quality:  Gradual  Duration:  2 days  Timing:  Constant  Progression:  Unchanged  Chronicity:  New  Relieved by:  Nothing  Worsened by:  Movement and palpation  Ineffective treatments:  None tried  Associated symptoms: no chest pain, no chills, no constipation, no cough, no diarrhea, no dysuria, no fever, no hematuria, no nausea, no shortness of breath, no sore throat, no vaginal bleeding, no vaginal discharge and no vomiting        Patient Care Team  Primary Care Provider: Alicia Rangel APRN    Past Medical History:     Allergies   Allergen Reactions   • Penicillins Seizure and Other (See Comments)     States can take keflex  States can take keflex     Past Medical History:   Diagnosis Date   • Depression    • RSD upper limb    • Vitamin D deficiency      Past Surgical History:   Procedure Laterality Date   • DENTAL PROCEDURE     • ELBOW OPEN REDUCTION INTERNAL FIXATION Left 7/15/2021    Procedure: ELBOW OPEN REDUCTION INTERNAL FIXATION;  Surgeon: Arnie Acuna MD;  Location: [facility] OR [facility] [place];  Service: Orthopedics;  Laterality: Left;   • TUBAL ABDOMINAL LIGATION     • WISDOM TOOTH EXTRACTION       Family History   Problem Relation Age of Onset   • Colon cancer Mother 56   • Malig Hyperthermia Neg Hx        Home Medications:  Prior to Admission medications    Medication Sig Start Date End Date Taking? Authorizing Provider   amitriptyline (ELAVIL) 10 MG tablet Take 10 mg by mouth every night at  bedtime. 3/25/22   Harpal Lindsay MD   busPIRone (BUSPAR) 5 MG tablet Take 5 mg by mouth 3 (Three) Times a Day. 2/9/22   Harpal Lindsay MD   cyclobenzaprine (FLEXERIL) 10 MG tablet Take 10 mg by mouth Daily. 8/27/21   Harpal Lindsay MD   Desvenlafaxine Succinate ER 25 MG tablet sustained-release 24 hour  3/26/22   Harpal Lindsay MD   escitalopram (LEXAPRO) 10 MG tablet Take 10 mg by mouth Daily. 4/27/21   Harpal Lindsay MD   gabapentin (NEURONTIN) 100 MG capsule  8/27/21   Harpal Lindsay MD   gabapentin (NEURONTIN) 400 MG capsule  3/25/22   Harpal Lindsay MD   HYDROcodone-acetaminophen (NORCO) 5-325 MG per tablet Take 1 tablet by mouth Every 6 (Six) Hours As Needed (AS NEEDED FOR POST OPERATIVE PAIN). 7/28/21   Arnie Acuna MD   ibuprofen (ADVIL,MOTRIN) 600 MG tablet Take 1 tablet by mouth Every 8 (Eight) Hours As Needed for Mild Pain . 8/18/21   Negar Woo PA   meloxicam (MOBIC) 15 MG tablet Take 15 mg by mouth. 1/29/22   Harpal Lindsay MD   ondansetron ODT (ZOFRAN-ODT) 4 MG disintegrating tablet Place 1 tablet on the tongue Every 6 (Six) Hours As Needed for Nausea or Vomiting. 7/10/21   Can Gonzalez DO   polyethylene glycol (GoLYTELY) 236 g solution Take as directed by prescriber's office. 5/3/22   Jyoti Singleton APRN   vitamin D (ERGOCALCIFEROL) 1.25 MG (38474 UT) capsule capsule TAKE 1 CAPSULE BY MOUTH TWICE A WEEK AS DIRECTED 6/1/21   Harpal Lindsay MD        Social History:   Social History     Tobacco Use   • Smoking status: Every Day     Packs/day: 0.50     Years: 5.00     Pack years: 2.50     Types: Cigarettes     Start date: 6/9/2009   • Smokeless tobacco: Never   Vaping Use   • Vaping Use: Never used   Substance Use Topics   • Alcohol use: Yes     Comment: social   • Drug use: Never         Review of Systems:  Review of Systems   Constitutional: Negative for chills and fever.   HENT: Negative for congestion, ear pain and sore  "throat.    Eyes: Negative for pain.   Respiratory: Negative for cough, chest tightness and shortness of breath.    Cardiovascular: Negative for chest pain.   Gastrointestinal: Positive for abdominal pain. Negative for constipation, diarrhea, nausea and vomiting.   Genitourinary: Positive for decreased urine volume. Negative for difficulty urinating, dyspareunia, dysuria, flank pain, hematuria, vaginal bleeding and vaginal discharge.   Musculoskeletal: Positive for myalgias. Negative for joint swelling.   Skin: Negative for pallor.   Neurological: Negative for seizures and headaches.   Hematological: Negative.    Psychiatric/Behavioral: Negative.    All other systems reviewed and are negative.       Physical Exam:  /75   Pulse 62   Temp 99.2 °F (37.3 °C) (Oral)   Resp 16   Ht 157.5 cm (62\")   Wt 63 kg (138 lb 14.2 oz)   SpO2 100%   BMI 25.40 kg/m²     Physical Exam  Vitals and nursing note reviewed.   Constitutional:       General: She is not in acute distress.     Appearance: Normal appearance. She is not toxic-appearing.   HENT:      Head: Normocephalic and atraumatic.      Mouth/Throat:      Mouth: Mucous membranes are moist.   Eyes:      General: No scleral icterus.  Cardiovascular:      Rate and Rhythm: Normal rate and regular rhythm.      Pulses: Normal pulses.      Heart sounds: Normal heart sounds.   Pulmonary:      Effort: Pulmonary effort is normal. No respiratory distress.      Breath sounds: Normal breath sounds.   Abdominal:      General: Bowel sounds are normal.      Palpations: Abdomen is soft.      Tenderness: There is abdominal tenderness in the right upper quadrant and right lower quadrant. There is no right CVA tenderness or left CVA tenderness.   Musculoskeletal:         General: Normal range of motion.      Cervical back: Normal range of motion and neck supple.   Skin:     General: Skin is warm and dry.   Neurological:      Mental Status: She is alert and oriented to person, place, " and time. Mental status is at baseline.   Psychiatric:         Mood and Affect: Mood normal.         Behavior: Behavior normal.          Procedures:  Procedures      Medical Decision Making:      Comorbidities that affect care:    None    External Notes reviewed:    None      The following orders were placed and all results were independently analyzed by me:  Orders Placed This Encounter   Procedures   • CT Abdomen Pelvis With Contrast   • [place] Draw   • Comprehensive Metabolic Panel   • Lipase   • Urinalysis With Microscopic If Indicated (No Culture) - Urine, Clean Catch   • hCG, Quantitative, Pregnancy   • CBC Auto Differential   • Urinalysis, Microscopic Only - Urine, Clean Catch   • Undress & Gown   • CBC & Differential   • Green Top (Gel)   • Lavender Top   • Gold Top - SST   • Light Blue Top       Medications Given in the Emergency Department:  Medications   ketorolac (TORADOL) injection 30 mg (30 mg Intravenous Given 3/22/23 2132)   sodium chloride 0.9 % bolus 1,000 mL (0 mL Intravenous Stopped 3/22/23 2252)   iopamidol (ISOVUE-370) 76 % injection 100 mL (97 mL Intravenous Given 3/22/23 2146)   metroNIDAZOLE (FLAGYL) tablet 500 mg (500 mg Oral Given 3/22/23 2258)   levoFLOXacin (LEVAQUIN) tablet 500 mg (500 mg Oral Given 3/22/23 2258)   dicyclomine (BENTYL) capsule 20 mg (20 mg Oral Given 3/22/23 2258)        ED Course:    ED Course as of 03/23/23 0612   Wed Mar 22, 2023   2028 PT NPO SINCE 5 PM. ATE TACO [DS]   2248 CT Abdomen Pelvis With Contrast  Colitis and constipation [DS]      ED Course User Index  [DS] Laurie Gomez APRN       Labs:    Lab Results (last 24 hours)     Procedure Component Value Units Date/Time    CBC & Differential [[id number]]  (Abnormal) Collected: 03/22/23 2001    Specimen: Blood Updated: 03/22/23 2010    Narrative:      The following orders were created for panel order CBC & Differential.  Procedure                               Abnormality         Status                      ---------                               -----------         ------                     CBC Auto Differential[[id number]]        Abnormal            Final result                 Please view results for these tests on the individual orders.    Comprehensive Metabolic Panel [[id number]]  (Abnormal) Collected: 03/22/23 2001    Specimen: Blood Updated: 03/22/23 2032     Glucose 148 mg/dL      BUN 13 mg/dL      Creatinine 0.73 mg/dL      Sodium 139 mmol/L      Potassium 3.5 mmol/L      Chloride 104 mmol/L      CO2 23.3 mmol/L      Calcium 9.1 mg/dL      Total Protein 6.6 g/dL      Albumin 3.9 g/dL      ALT (SGPT) 13 U/L      AST (SGOT) 13 U/L      Alkaline Phosphatase 93 U/L      Total Bilirubin 0.3 mg/dL      Globulin 2.7 gm/dL      A/G Ratio 1.4 g/dL      BUN/Creatinine Ratio 17.8     Anion Gap 11.7 mmol/L      eGFR 109.5 mL/min/1.73     Narrative:      GFR Normal >60  Chronic Kidney Disease <60  Kidney Failure <15      Lipase [[id number]]  (Normal) Collected: 03/22/23 2001    Specimen: Blood Updated: 03/22/23 2032     Lipase 26 U/L     hCG, Quantitative, Pregnancy [[id number]] Collected: 03/22/23 2001    Specimen: Blood Updated: 03/22/23 2029     HCG Quantitative <0.50 mIU/mL     Narrative:      HCG Ranges by Gestational Age    Females - non-pregnant premenopausal   </= 1mIU/mL HCG  Females - postmenopausal               </= 7mIU/mL HCG    3 Weeks       5.4   -      72 mIU/mL  4 Weeks      10.2   -     708 mIU/mL  5 Weeks       217   -   8,245 mIU/mL  6 Weeks       152   -  32,177 mIU/mL  7 Weeks     4,059   - 153,767 mIU/mL  8 Weeks    31,366   - 149,094 mIU/mL  9 Weeks    59,109   - 135,901 mIU/mL  10 Weeks   44,186   - 170,409 mIU/mL  12 Weeks   27,107   - 201,615 mIU/mL  14 Weeks   24,302   -  93,646 mIU/mL  15 Weeks   12,540   -  69,747 mIU/mL  16 Weeks    8,904   -  55,332 mIU/mL  17 Weeks    8,240   -  51,793 mIU/mL  18 Weeks    9,649   -  55,271 mIU/mL      CBC Auto Differential [[id number]]  (Abnormal) Collected:  03/22/23 2001    Specimen: Blood Updated: 03/22/23 2010     WBC 14.03 10*3/mm3      RBC 4.29 10*6/mm3      Hemoglobin 12.7 g/dL      Hematocrit 38.2 %      MCV 89.0 fL      MCH 29.6 pg      MCHC 33.2 g/dL      RDW 12.9 %      RDW-SD 42.0 fl      MPV 9.3 fL      Platelets 254 10*3/mm3      Neutrophil % 77.7 %      Lymphocyte % 15.6 %      Monocyte % 5.1 %      Eosinophil % 0.9 %      Basophil % 0.3 %      Immature Grans % 0.4 %      Neutrophils, Absolute 10.91 10*3/mm3      Lymphocytes, Absolute 2.19 10*3/mm3      Monocytes, Absolute 0.72 10*3/mm3      Eosinophils, Absolute 0.12 10*3/mm3      Basophils, Absolute 0.04 10*3/mm3      Immature Grans, Absolute 0.05 10*3/mm3      nRBC 0.0 /100 WBC     Urinalysis With Microscopic If Indicated (No Culture) - Urine, Clean Catch [[id number]]  (Abnormal) Collected: 03/22/23 2007    Specimen: Urine, Clean Catch Updated: 03/22/23 2023     Color, UA Yellow     Appearance, UA Clear     pH, UA 5.5     Specific Gravity, UA 1.023     Glucose,  mg/dL (Trace)     Ketones, UA Negative     Bilirubin, UA Negative     Blood, UA Moderate (2+)     Protein, UA Negative     Leuk Esterase, UA Trace     Nitrite, UA Negative     Urobilinogen, UA 0.2 E.U./dL    Urinalysis, Microscopic Only - Urine, Clean Catch [[id number]]  (Abnormal) Collected: 03/22/23 2007    Specimen: Urine, Clean Catch Updated: 03/22/23 2023     RBC, UA 0-2 /HPF      WBC, UA 3-5 /HPF      Bacteria, UA None Seen /HPF      Squamous Epithelial Cells, UA 3-6 /HPF      Hyaline Casts, UA 3-6 /LPF      Methodology Automated Microscopy           Imaging:    CT Abdomen Pelvis With Contrast    Result Date: 3/22/2023  PROCEDURE: CT ABDOMEN PELVIS W CONTRAST  COMPARISON: None.  INDICATIONS: 37-YEAR-OLD FEMALE W/ H/O RIGHT LOWER QUADRANT ABDOMINAL PAIN X 2-3 DAYS.  TECHNIQUE: After obtaining the patient's consent, 655 CT images were created with non-ionic intravenous contrast material.  No oral contrast agent was administered for the  study.  PROTOCOL:   Standard CT imaging protocol performed.    RADIATION:   Total DLP: 258 mGy*cm.   Automated exposure control was utilized to minimize radiation dose. CONTRAST: 97 mL Isovue 370 I.V. LABS:   eGFR: 90.2 mL/min/1.73m^2.  FINDINGS: No acute appendicitis.  Age-indeterminate but possibly acute-to-subacute inflammatory change surrounds the ascending colon, especially at or near the level of the hepatic flexure, measuring about 6-7 cm in length, such as seen on coronal images 42-67 of series 3.  Mural thickening is present in this region.  The anti-mesenteric side or lateral aspect of the colon is affected to a greater degree than that seen along the mesenteric side.  The findings may represent segmental colitis.  The findings are probably mild-to-moderate in degree.  No pneumoperitoneum or pneumatosis.  No pericolonic fluid collection is seen to suggest abscess or fistula.  No definite portal or mesenteric venous gas is seen to suggest ischemic colitis.  Diverticulitis or epiploic appendagitis is thought to be less likely but would be in the differential diagnosis.  A moderate-to-large amount of formed stool is seen throughout the colon, suggesting fecal stasis as with constipation.  The remainder of the colon is unaffected.  No mechanical bowel obstruction is appreciated.   Additionally, there is diffuse hepatic steatosis without hepatomegaly.  No hepatic metastases are seen.  No suspicious focal hepatic lesion is identified.  No splenomegaly.  A tiny hiatal hernia is seen.  No renal or ureteral calculi.  No acute pyelonephritis.  No hydronephrosis.  No acute cholecystitis or pancreatitis.  No definite gallstones.  No adrenal mass.  A tiny fat-containing umbilical hernia is seen.  It does not contain bowel.  It is suspected the patient has undergone bilateral tubal ligation.  There are colonic diverticula, including those involving the descending and sigmoid colon.  The partially imaged lung bases are  clear of acute infiltrate.  Minimal subsegmental atelectasis may involve the lung bases.  No acute fracture or aggressive osseous lesion is seen.  The 2D sagittal images are limited in that they are presented out of order.         1. There is suspected acute-to-subacute uncomplicated mild-to-moderate segmental colitis involving the ascending colon, near the hepatic flexure of colon.  Consider close interval clinical and imaging follow-up of the findings to ensure a benign progression and to exclude an underlying malignant process, which is thought to be less likely, considering the patient's age.   2. There may be fecal stasis as with constipation.  Fecal impaction cannot be excluded.  No stercoral ulceration is seen.   3. No acute appendicitis is seen.   4. Please see above comments for further detail.    Please note that portions of this note were completed with a voice recognition program.  LUDY MEDINA JR, MD       Electronically Signed and Approved By: LUDY MEDINA JR, MD on 3/22/2023 at 22:43                  Differential Diagnosis and Discussion:    Abdominal Pain: Based on the patient's signs and symptoms, I considered abdominal aortic aneurysm, small bowel obstruction, pancreatitis, acute cholecystitis, acute appendecitis, peptic ulcer disease, gastritis, colitis, endocrine disorders, irritable bowel syndrome and other differential diagnosis an etiology of the patient's abdominal pain.    All labs were reviewed and interpreted by me.  CT scan radiology interpretation was reviewed by me.    MDM  Number of Diagnoses or Management Options  Segmental colitis without complication (HCC)  Diagnosis management comments: The patient is resting comfortably and feels better, is alert and in no distress. Repeat examination is unremarkable and benign; in particular, there's no discomfort at McBurney's point and there is no pulsatile mass. The history, exam, diagnostic testing, and current condition does not suggest  acute appendicitis, bowel obstruction, acute cholecystitis, bowel perforation, major gastrointestinal bleeding, severe diverticulitis, abdominal aortic aneurysm, mesenteric ischemia, volvulus, sepsis, or other significant pathology that warrants further testing, continued ED treatment, admission, for surgical evaluation at this point. The vital signs have been stable. The patient does not have uncontrollable pain, intractable vomiting, or other significant symptoms. The patient's condition is stable and appropriate for discharge from the emergency department.         Amount and/or Complexity of Data Reviewed  Clinical lab tests: reviewed and ordered  Tests in the radiology section of CPT®: reviewed and ordered  Tests in the medicine section of CPT®: ordered and reviewed    Risk of Complications, Morbidity, and/or Mortality  Presenting problems: moderate  Diagnostic procedures: moderate  Management options: low    Patient Progress  Patient progress: stable         Patient Care Considerations:    CONSULT: I considered consulting Gastroenterology or surgery, however Patient has no findings of obstruction and patient has no complications and can be treated outpatient with follow-up      Consultants/Shared Management Plan:    None    Social Determinants of Health:    Patient is independent, reliable, and has access to care.       Disposition and Care Coordination:    Discharged: The patient is suitable and stable for discharge with no need for consideration of observation or admission.    I have explained the patient´s condition, diagnoses and treatment plan based on the information available to me at this time. I have answered questions and addressed any concerns. The patient has a good  understanding of the patient´s diagnosis, condition, and treatment plan as can be expected at this point. The vital signs have been stable. The patient´s condition is stable and appropriate for discharge from the emergency department.       The patient will pursue further outpatient evaluation with the primary care physician or other designated or consulting physician as outlined in the discharge instructions. They are agreeable to this plan of care and follow-up instructions have been explained in detail. The patient has received these instructions in written format and have expressed an understanding of the discharge instructions. The patient is aware that any significant change in condition or worsening of symptoms should prompt an immediate return to this or the closest emergency department or call to 911.  I have explained discharge medications and the need for follow up with the patient/caretakers. This was also printed in the discharge instructions. Patient was discharged with the following medications and follow up:      Medication List      New Prescriptions    ciprofloxacin 500 MG tablet  Commonly known as: CIPRO  Take 1 tablet by mouth 2 (Two) Times a Day for 7 days.     dicyclomine 20 MG tablet  Commonly known as: BENTYL  Take 1 tablet by mouth Every 8 (Eight) Hours As Needed (abdominal pain).     metroNIDAZOLE 500 MG tablet  Commonly known as: FLAGYL  Take 1 tablet by mouth 2 (Two) Times a Day for 7 days.        Changed    ondansetron ODT 4 MG disintegrating tablet  Commonly known as: ZOFRAN-ODT  Place 1 tablet on the tongue Every 8 (Eight) Hours As Needed for Nausea or Vomiting.  What changed: when to take this           Where to Get Your Medications      These medications were sent to [id number] - [place], [place] - [address] AT [facility] - [phone] [facility] [phone] [address]  [address] [place] [address]    Phone: [phone]   · ciprofloxacin 500 MG tablet  · dicyclomine 20 MG tablet  · metroNIDAZOLE 500 MG tablet  · ondansetron ODT 4 MG disintegrating tablet      Alicia Rangel APRN  [address] [facility]  [address] [place] [id number] [phone]      As needed       Final  diagnoses:   Segmental colitis without complication (HCC)        ED Disposition     ED Disposition   Discharge    Condition   Stable    Comment   --             This medical record created using voice recognition software.           Laurie Gomez, APRN  03/23/23 0612

## 2023-03-23 NOTE — DISCHARGE INSTRUCTIONS
Take medications as prescribed.    Drink plenty of fluids.    Follow-up with PCP if no better.    Return for new or worsening symptoms

## 2023-06-09 ENCOUNTER — TELEPHONE (OUTPATIENT)
Dept: OBSTETRICS AND GYNECOLOGY | Facility: CLINIC | Age: 38
End: 2023-06-09
Payer: COMMERCIAL

## 2023-06-09 NOTE — TELEPHONE ENCOUNTER
I have called the patient and let her know that for these symptoms she would need to be evaluated and suggested she get in contact with her pcp or Urgent Care.  The patient refused Urgent Care and said that she does not have transportation today.  I advised that I highly suggest she try and find transportation if she is able to be evaluated at Urgent Care today.  I have set up an appointment for her to be evaluated on Monday with Rahul Mathew.

## 2023-06-09 NOTE — TELEPHONE ENCOUNTER
Caller: Tona Porter    Relationship: Self    Best call back number: [phone]    What medication are you requesting: SOMETHING FOR A UTI    What are your current symptoms: BURNING, PAIN AND IRRITATION     How long have you been experiencing symptoms: A FEW DAYS    Have you had these symptoms before:    [x] Yes  [] No    Have you been treated for these symptoms before:   [] Yes  [x] No    If a prescription is needed, what is your preferred pharmacy and phone number:  [facility] [place] [address]    Additional notes: PT HAS ANNUAL SCHEDULED FOR JULY 11TH - BUT HAS A UTI CURRENTLY AND IS WANTING TO SEE IF SHE CAN GET SOMETHING FOR THIS.    PLEASE CALL PT TO ADVISE AND CONFIRM IF MEDICATION CAN BE SENT OVER FOR HER

## 2023-06-12 ENCOUNTER — HOSPITAL ENCOUNTER (EMERGENCY)
Facility: HOSPITAL | Age: 38
Discharge: LEFT BEFORE DISCHARGE | End: 2023-06-12
Attending: EMERGENCY MEDICINE | Admitting: EMERGENCY MEDICINE
Payer: COMMERCIAL

## 2023-06-12 VITALS
RESPIRATION RATE: 18 BRPM | WEIGHT: 125.44 LBS | SYSTOLIC BLOOD PRESSURE: 117 MMHG | HEART RATE: 105 BPM | DIASTOLIC BLOOD PRESSURE: 50 MMHG | OXYGEN SATURATION: 100 % | HEIGHT: 62 IN | TEMPERATURE: 98.2 F | BODY MASS INDEX: 23.08 KG/M2

## 2023-06-12 DIAGNOSIS — B37.0 ORAL THRUSH: Primary | ICD-10-CM

## 2023-06-12 DIAGNOSIS — R21 FACIAL RASH: ICD-10-CM

## 2023-06-12 PROCEDURE — 99281 EMR DPT VST MAYX REQ PHY/QHP: CPT

## 2023-06-12 NOTE — ED PROVIDER NOTES
Time: 4:36 PM EDT  Date of encounter:  6/12/2023  Independent Historian/Clinical History and Information was obtained by:   Patient  Chief Complaint: mouth pain    History is limited by: N/A    History of Present Illness:  Patient is a 37 y.o. year old female who presents to the emergency department for evaluation of mouth pain.    Pt has mouth pain due to sores in her mouth. Pt notes her mouth is swollen and her throat is swollen. Pt is asking to do a spinal tap to check for fungal infection as well as a stronger antifungal medication. She says her face is now swollen and she has a rash.  She reports that she has been diagnosed with fungal infection for a long time and was recently on ketoconazole.  States that treatment is not really helping her.  Reports that the rash on her face is not much different from typical.  She notes she has not seen her dermatologist in 6 months and that her dermatologist was not helping her.         Patient Care Team  Primary Care Provider: Alicia Rangel APRN    Past Medical History:     Allergies   Allergen Reactions    Penicillins Seizure and Other (See Comments)     States can take keflex  States can take keflex     Past Medical History:   Diagnosis Date    Depression     RSD upper limb     Vitamin D deficiency      Past Surgical History:   Procedure Laterality Date    DENTAL PROCEDURE      ELBOW OPEN REDUCTION INTERNAL FIXATION Left 7/15/2021    Procedure: ELBOW OPEN REDUCTION INTERNAL FIXATION;  Surgeon: Arnie Acuna MD;  Location: [facility] OR [facility] [place];  Service: Orthopedics;  Laterality: Left;    TUBAL ABDOMINAL LIGATION      WISDOM TOOTH EXTRACTION       Family History   Problem Relation Age of Onset    Colon cancer Mother 56    Malig Hyperthermia Neg Hx        Home Medications:  Prior to Admission medications    Medication Sig Start Date End Date Taking? Authorizing Provider   amitriptyline (ELAVIL) 10 MG tablet Take 1 tablet by mouth every night at bedtime. 3/25/22   Provider,  MD Harpal   busPIRone (BUSPAR) 5 MG tablet Take 1 tablet by mouth 3 (Three) Times a Day. 2/9/22   Harpal Lindsay MD   cyclobenzaprine (FLEXERIL) 10 MG tablet Take 1 tablet by mouth Daily. 8/27/21   Harpal Lindsay MD   Desvenlafaxine Succinate ER 25 MG tablet sustained-release 24 hour  3/26/22   Harpal Lindsay MD   dicyclomine (BENTYL) 20 MG tablet Take 1 tablet by mouth Every 8 (Eight) Hours As Needed (abdominal pain). 3/22/23   Laurie Gomez APRN   escitalopram (LEXAPRO) 10 MG tablet Take 1 tablet by mouth Daily. 4/27/21   Harpal Lindsay MD   gabapentin (NEURONTIN) 100 MG capsule  8/27/21   Harpal Lindsay MD   gabapentin (NEURONTIN) 400 MG capsule  3/25/22   Harpal Lindsay MD   HYDROcodone-acetaminophen (NORCO) 5-325 MG per tablet Take 1 tablet by mouth Every 6 (Six) Hours As Needed (AS NEEDED FOR POST OPERATIVE PAIN). 7/28/21   Arnie Acuna MD   ibuprofen (ADVIL,MOTRIN) 600 MG tablet Take 1 tablet by mouth Every 8 (Eight) Hours As Needed for Mild Pain . 8/18/21   Negar Woo PA   meloxicam (MOBIC) 15 MG tablet Take 1 tablet by mouth. 1/29/22   Harpal Lindsay MD   ondansetron ODT (ZOFRAN-ODT) 4 MG disintegrating tablet Place 1 tablet on the tongue Every 8 (Eight) Hours As Needed for Nausea or Vomiting. 3/22/23   Laurie Gomez APRN   polyethylene glycol (GoLYTELY) 236 g solution Take as directed by prescriber's office. 5/3/22   Jyoti Singleton APRN   vitamin D (ERGOCALCIFEROL) 1.25 MG (38080 UT) capsule capsule TAKE 1 CAPSULE BY MOUTH TWICE A WEEK AS DIRECTED 6/1/21   Harpal Lindsay MD        Social History:   Social History     Tobacco Use    Smoking status: Every Day     Packs/day: 0.50     Years: 5.00     Pack years: 2.50     Types: Cigarettes     Start date: 6/9/2009    Smokeless tobacco: Never   Vaping Use    Vaping Use: Never used   Substance Use Topics    Alcohol use: Yes     Comment: social    Drug use: Never         Review of  "Systems:  Review of Systems   Constitutional:  Negative for chills and fever.   HENT:  Positive for dental problem, facial swelling and mouth sores. Negative for congestion, drooling, ear pain and sore throat.    Eyes:  Negative for pain.   Respiratory:  Negative for cough, chest tightness and shortness of breath.    Cardiovascular:  Negative for chest pain.   Gastrointestinal:  Negative for abdominal pain, diarrhea, nausea and vomiting.   Genitourinary:  Negative for flank pain and hematuria.   Musculoskeletal:  Negative for joint swelling.   Skin:  Positive for rash. Negative for pallor.   Neurological:  Negative for seizures and headaches.   All other systems reviewed and are negative.     Physical Exam:  /50 (BP Location: Right arm, Patient Position: Sitting)   Pulse 105   Temp 98.2 °F (36.8 °C) (Oral)   Resp 18   Ht 157.5 cm (62\")   Wt 56.9 kg (125 lb 7.1 oz)   LMP 06/09/2023   SpO2 100%   BMI 22.94 kg/m²     Physical Exam  Vitals and nursing note reviewed.   Constitutional:       General: She is not in acute distress.     Appearance: Normal appearance. She is not toxic-appearing.   HENT:      Head: Normocephalic and atraumatic.      Mouth/Throat:      Lips: Pink.      Mouth: Mucous membranes are moist.      Dentition: Abnormal dentition. Has dentures.      Pharynx: Oropharynx is clear. Uvula midline.      Comments: Ulceration on her gums.  Eyes:      General: No scleral icterus.  Cardiovascular:      Rate and Rhythm: Normal rate and regular rhythm.      Pulses: Normal pulses.           Radial pulses are 2+ on the right side and 2+ on the left side.      Heart sounds: Normal heart sounds.   Pulmonary:      Effort: Pulmonary effort is normal. No respiratory distress.      Breath sounds: Normal breath sounds.   Abdominal:      General: Abdomen is flat.      Palpations: Abdomen is soft.      Tenderness: There is no abdominal tenderness.   Musculoskeletal:         General: Normal range of motion.      " Cervical back: Normal range of motion and neck supple.   Skin:     General: Skin is warm and dry.   Neurological:      Mental Status: She is alert and oriented to person, place, and time. Mental status is at baseline.                Procedures:  Procedures      Medical Decision Making:      Comorbidities that affect care:    None    External Notes reviewed:    Previous ED Note: Pt was seen in this ED on 1/27/23 for same issue. The visit reports she has had this facial and mouth infection for 8 years.      The following orders were placed and all results were independently analyzed by me:  No orders of the defined types were placed in this encounter.      Medications Given in the Emergency Department:  Medications - No data to display     ED Course:         Labs:    Lab Results (last 24 hours)       ** No results found for the last 24 hours. **             Imaging:    No Radiology Exams Resulted Within Past 24 Hours      Differential Diagnosis and Discussion:    Facial Pain/Swelling: Differential diagnosis includes but is not limited to temporal arteritis, intracranial tumors, neuralgias, dental disease, ocular disease, TMJ syndrome, salivary gland disorders, sinusitis, cluster headaches, migraines, and psychogenic.        MDM  Number of Diagnoses or Management Options  Facial rash  Oral thrush  Diagnosis management comments: I counseled the patient that due to the high risk for hepatotoxicity stronger antifungals need to be prescribed and managed by either her PCP or dermatologist.  Advised patient that I would send nystatin swish and swallow medication to her pharmacy to treat her mouth sores. I do not believe that the patient has an acute emergency medical condition requiring additional emergency management at this time. The patient is currently stable for outpatient treatment and continuation of care. Important signs and symptoms that would warrant return to the emergency department were reviewed. The patient was  provided the opportunity to ask questions. All questions were addressed and the patient was discharged from the ED. The patient demonstrated understanding and agreed to plan      Risk of Complications, Morbidity, and/or Mortality  Presenting problems: low  Diagnostic procedures: minimal  Management options: minimal    Patient Progress  Patient progress: stable           Patient Care Considerations:          Consultants/Shared Management Plan:        Social Determinants of Health:    Patient is independent, reliable, and has access to care.       Disposition and Care Coordination:    Discharged: The patient is suitable and stable for discharge with no need for consideration of observation or admission.        Final diagnoses:   Oral thrush   Facial rash        ED Disposition       ED Disposition   Eloped    Condition   --    Comment   Pt left before DC papers were given/Pt was not seen in room                This medical record created using voice recognition software.    Documentation assistance provided by Amilcar Schumacher acting as scribe for BEBE Mercer. Information recorded by the scribe was done at my direction and has been verified and validated by me.           Amilcar Schumacher  06/12/23 [id number]       Tabitha Arevalo APRN  06/12/23 [id number]

## 2023-06-12 NOTE — DISCHARGE INSTRUCTIONS
Use nystatin swish and swallow as advised.  Avoid eating acidic foods.    Please follow-up with your dermatologist and/or PCP for further management of your fungal infection.    Return for worsening symptoms or any new concerns.

## 2023-07-21 ENCOUNTER — TELEPHONE (OUTPATIENT)
Dept: OBSTETRICS AND GYNECOLOGY | Facility: CLINIC | Age: 38
End: 2023-07-21

## 2024-03-25 ENCOUNTER — TELEPHONE (OUTPATIENT)
Dept: OBSTETRICS AND GYNECOLOGY | Facility: CLINIC | Age: 39
End: 2024-03-25
Payer: COMMERCIAL

## 2024-03-25 DIAGNOSIS — Z12.11 COLON CANCER SCREENING: Primary | ICD-10-CM

## 2024-03-25 DIAGNOSIS — Z80.0 FAMILY HISTORY OF COLON CANCER: ICD-10-CM

## 2024-03-25 NOTE — TELEPHONE ENCOUNTER
Caller: Tona Porter    Relationship: Self    Best call back number: [phone]    What is the medical concern/diagnosis: COLONOSCOPY    What specialty or service is being requested: GASTROENTEROLOGY    What is the provider, practice or medical service name: [facility]     What is the office location: [address] [place] [address]    What is the office phone number: [phone]    Any additional details: PATIENT WAS REFERRED LAST YEAR BUT GOT CDIFF IN THE PROCESS AND NOW REFERRAL IS . PLEASE PUT IN NEW REFERRAL.

## 2024-03-25 NOTE — TELEPHONE ENCOUNTER
Patient requesting a new referral for her colonoscopy to Dr. Tsang. Please sign the attached if okay to provide.

## (undated) DEVICE — EXTREMITY-LF: Brand: MEDLINE INDUSTRIES, INC.

## (undated) DEVICE — PAD GRND REM POLYHESIVE A/ DISP

## (undated) DEVICE — SUT VIC UD BR COAT 0 CP2 27IN

## (undated) DEVICE — DRSNG GZ PETROLTM XEROFORM CURAD 1X8IN STRL

## (undated) DEVICE — DRSNG PAD ABD 8X10IN STRL

## (undated) DEVICE — BIT DRL QC W DEPTH MARK 2X140MM

## (undated) DEVICE — Device

## (undated) DEVICE — STERILE POLYISOPRENE POWDER-FREE SURGICAL GLOVES: Brand: PROTEXIS

## (undated) DEVICE — GW NOTHRD SPADE PT 1.6X150MM

## (undated) DEVICE — UNDYED BRAIDED (POLYGLACTIN 910), SYNTHETIC ABSORBABLE SUTURE: Brand: COATED VICRYL

## (undated) DEVICE — COMFORT ARM SLING: Brand: DEROYAL

## (undated) DEVICE — GLV SURG SENSICARE W/ALOE PF LF 7 STRL

## (undated) DEVICE — PROXIMATE RH ROTATING HEAD SKIN STAPLERS (35 WIDE) CONTAINS 35 STAINLESS STEEL STAPLES: Brand: PROXIMATE

## (undated) DEVICE — SOL IRR NACL 0.9PCT BT 1000ML

## (undated) DEVICE — INTENDED FOR TISSUE SEPARATION, AND OTHER PROCEDURES THAT REQUIRE A SHARP SURGICAL BLADE TO PUNCTURE OR CUT.: Brand: BARD-PARKER ® CARBON RIB-BACK BLADES

## (undated) DEVICE — UNDERCAST PADDING: Brand: DEROYAL

## (undated) DEVICE — BNDG ELAS CO-FLEX SLF ADHR 6IN 5YD LF STRL

## (undated) DEVICE — GLV SURG SENSICARE SLT PF LF 7 STRL

## (undated) DEVICE — LARGE SHEET: Brand: CONVERTORS

## (undated) DEVICE — 3M™ STERI-DRAPE™ X-RAY IMAGE INTENSIFIER DRAPE, 10 PER CARTON / 4 CARTONS PER CASE, 1013: Brand: STERI-DRAPE™

## (undated) DEVICE — GAUZE,SPONGE,4"X4",16PLY,STRL,LF,10/TRAY: Brand: MEDLINE

## (undated) DEVICE — SUT MNCRYL PLS ANTIB UD 3/0 PS2 27IN

## (undated) DEVICE — BNDG ELAS DELUXE REINF 10CM 5MTR STRL

## (undated) DEVICE — CVR LEG BOOTLEG F/R NOSKID 33IN

## (undated) DEVICE — GLV SURG ULTRAFREE MAX LTX PF 8

## (undated) DEVICE — BNDG ESMARK 4IN 12FT LF STRL BLU

## (undated) DEVICE — TOWEL,OR,DSP,ST,BLUE,STD,4/PK,20PK/CS: Brand: MEDLINE

## (undated) DEVICE — APPL CHLORAPREP HI/LITE 26ML ORNG

## (undated) DEVICE — BNDG ELAS CO-FLEX SLF ADHR 4IN5YD LF STRL

## (undated) DEVICE — BIT DRL QC DIA 2.5X110MM